# Patient Record
Sex: MALE | Race: OTHER | Employment: FULL TIME | URBAN - METROPOLITAN AREA
[De-identification: names, ages, dates, MRNs, and addresses within clinical notes are randomized per-mention and may not be internally consistent; named-entity substitution may affect disease eponyms.]

---

## 2018-04-12 ENCOUNTER — OFFICE VISIT (OUTPATIENT)
Dept: FAMILY MEDICINE CLINIC | Facility: CLINIC | Age: 58
End: 2018-04-12

## 2018-04-12 ENCOUNTER — NURSE TRIAGE (OUTPATIENT)
Dept: FAMILY MEDICINE CLINIC | Facility: CLINIC | Age: 58
End: 2018-04-12

## 2018-04-12 VITALS
SYSTOLIC BLOOD PRESSURE: 132 MMHG | HEART RATE: 84 BPM | WEIGHT: 195 LBS | DIASTOLIC BLOOD PRESSURE: 83 MMHG | HEIGHT: 70 IN | TEMPERATURE: 99 F | BODY MASS INDEX: 27.92 KG/M2

## 2018-04-12 DIAGNOSIS — J06.9 ACUTE URI: Primary | ICD-10-CM

## 2018-04-12 PROCEDURE — 99213 OFFICE O/P EST LOW 20 MIN: CPT | Performed by: NURSE PRACTITIONER

## 2018-04-12 RX ORDER — LEVOFLOXACIN 500 MG/1
500 TABLET, FILM COATED ORAL DAILY
Qty: 10 TABLET | Refills: 0 | Status: SHIPPED | OUTPATIENT
Start: 2018-04-12 | End: 2018-04-22

## 2018-04-12 RX ORDER — PREDNISONE 20 MG/1
TABLET ORAL
Qty: 10 TABLET | Refills: 0 | Status: SHIPPED | OUTPATIENT
Start: 2018-04-12 | End: 2018-08-21 | Stop reason: ALTCHOICE

## 2018-04-12 RX ORDER — GUAIFENESIN AND CODEINE PHOSPHATE 100; 10 MG/5ML; MG/5ML
SOLUTION ORAL
Qty: 118 ML | Refills: 0 | Status: SHIPPED | OUTPATIENT
Start: 2018-04-12 | End: 2018-08-21 | Stop reason: ALTCHOICE

## 2018-04-12 NOTE — PROGRESS NOTES
HPI  P there for chest congestion-having a lot of wheezing but no sputum production. Dry cough, feels like chest is rattling. Has sinus congestion-nasal d/c is clear. Pt denies fever, ear pain-has sore throat from coughing so much.   Taking otc meds with Outpatient Prescriptions:  levofloxacin (LEVAQUIN) 500 MG Oral Tab Take 1 tablet (500 mg total) by mouth daily. Disp: 10 tablet Rfl: 0   Guaifenesin-Codeine 100-10 MG/5ML Oral Syrup Take 1 tsp every 6 hours for cough as needed.  May cause drowsiness Disp: 1 days  cheratussin ac 1 tsp po q 6 hrs prn coughing  supportive care discussed    Please call if symptoms worsen or are not resolving. Discussed plan of care with pt and pt is in agreement. All questions answered.  Pt to call with questions or concerns

## 2018-04-12 NOTE — PATIENT INSTRUCTIONS
SINUSITIS (SINUS INFECTION)    -encourage fluids  -humidifier at bedside  -tylenol or ibuprofen for pain, fever  -check temperature with thermometer  -tom pot-use distilled water with accompanied salt packets 2-3 times per day and as needed  -call if sym

## 2018-08-20 ENCOUNTER — TELEPHONE (OUTPATIENT)
Dept: FAMILY MEDICINE CLINIC | Facility: CLINIC | Age: 58
End: 2018-08-20

## 2018-08-20 DIAGNOSIS — Z12.5 PROSTATE CANCER SCREENING: Primary | ICD-10-CM

## 2018-08-20 NOTE — TELEPHONE ENCOUNTER
STEPHY please schedule physical per Dr. Sheryl Bishop message below. Patient needs appointment for any orders.

## 2018-08-21 ENCOUNTER — OFFICE VISIT (OUTPATIENT)
Dept: FAMILY MEDICINE CLINIC | Facility: CLINIC | Age: 58
End: 2018-08-21

## 2018-08-21 VITALS
DIASTOLIC BLOOD PRESSURE: 77 MMHG | BODY MASS INDEX: 30 KG/M2 | HEART RATE: 77 BPM | SYSTOLIC BLOOD PRESSURE: 129 MMHG | WEIGHT: 209 LBS

## 2018-08-21 DIAGNOSIS — Z12.11 SCREEN FOR COLON CANCER: ICD-10-CM

## 2018-08-21 DIAGNOSIS — R35.0 URINARY FREQUENCY: ICD-10-CM

## 2018-08-21 DIAGNOSIS — I25.2 HISTORY OF HEART ATTACK: ICD-10-CM

## 2018-08-21 DIAGNOSIS — N40.1 BENIGN PROSTATIC HYPERPLASIA WITH LOWER URINARY TRACT SYMPTOMS, SYMPTOM DETAILS UNSPECIFIED: ICD-10-CM

## 2018-08-21 DIAGNOSIS — N52.9 ERECTILE DYSFUNCTION, UNSPECIFIED ERECTILE DYSFUNCTION TYPE: Primary | ICD-10-CM

## 2018-08-21 DIAGNOSIS — Z13.220 SCREENING, LIPID: ICD-10-CM

## 2018-08-21 DIAGNOSIS — Z72.0 TOBACCO ABUSE: ICD-10-CM

## 2018-08-21 PROCEDURE — 99212 OFFICE O/P EST SF 10 MIN: CPT | Performed by: FAMILY MEDICINE

## 2018-08-21 PROCEDURE — 99214 OFFICE O/P EST MOD 30 MIN: CPT | Performed by: FAMILY MEDICINE

## 2018-08-21 NOTE — PROGRESS NOTES
Sarah Hobbs is a 59year old male. Patient presents with:  Prostate Problem    HPI:   New to me. Reports slow flow with urination for few months and having issues with erectile dysfunction for few years. No chronic medications. Does smoke regularly. erectile dysfunction type  Checking labs to make sure nothing else going on. May do cialis daily depending on results. - CBC WITH DIFFERENTIAL WITH PLATELET; Future  - COMP METABOLIC PANEL (14); Future  - LIPID PANEL;  Future  - TSH W REFLEX TO FREE T4;

## 2018-08-22 ENCOUNTER — LAB ENCOUNTER (OUTPATIENT)
Dept: LAB | Age: 58
End: 2018-08-22
Attending: FAMILY MEDICINE
Payer: COMMERCIAL

## 2018-08-22 DIAGNOSIS — Z72.0 TOBACCO ABUSE: ICD-10-CM

## 2018-08-22 DIAGNOSIS — Z13.220 SCREENING, LIPID: ICD-10-CM

## 2018-08-22 DIAGNOSIS — N52.9 ERECTILE DYSFUNCTION, UNSPECIFIED ERECTILE DYSFUNCTION TYPE: ICD-10-CM

## 2018-08-22 DIAGNOSIS — I25.2 HISTORY OF HEART ATTACK: ICD-10-CM

## 2018-08-22 DIAGNOSIS — R35.0 URINARY FREQUENCY: ICD-10-CM

## 2018-08-22 LAB
ALBUMIN SERPL BCP-MCNC: 3.9 G/DL (ref 3.5–4.8)
ALBUMIN/GLOB SERPL: 1.1 {RATIO} (ref 1–2)
ALP SERPL-CCNC: 67 U/L (ref 32–100)
ALT SERPL-CCNC: 18 U/L (ref 17–63)
ANION GAP SERPL CALC-SCNC: 7 MMOL/L (ref 0–18)
AST SERPL-CCNC: 21 U/L (ref 15–41)
BACTERIA UR QL AUTO: NEGATIVE /HPF
BASOPHILS # BLD: 0.1 K/UL (ref 0–0.2)
BASOPHILS NFR BLD: 1 %
BILIRUB SERPL-MCNC: 0.6 MG/DL (ref 0.3–1.2)
BILIRUB UR QL: NEGATIVE
BUN SERPL-MCNC: 12 MG/DL (ref 8–20)
BUN/CREAT SERPL: 12.1 (ref 10–20)
CALCIUM SERPL-MCNC: 9.6 MG/DL (ref 8.5–10.5)
CHLORIDE SERPL-SCNC: 105 MMOL/L (ref 95–110)
CHOLEST SERPL-MCNC: 245 MG/DL (ref 110–200)
CLARITY UR: CLEAR
CO2 SERPL-SCNC: 28 MMOL/L (ref 22–32)
COLOR UR: YELLOW
CREAT SERPL-MCNC: 0.99 MG/DL (ref 0.5–1.5)
EOSINOPHIL # BLD: 0.3 K/UL (ref 0–0.7)
EOSINOPHIL NFR BLD: 3 %
ERYTHROCYTE [DISTWIDTH] IN BLOOD BY AUTOMATED COUNT: 13.5 % (ref 11–15)
GLOBULIN PLAS-MCNC: 3.5 G/DL (ref 2.5–3.7)
GLUCOSE SERPL-MCNC: 97 MG/DL (ref 70–99)
GLUCOSE UR-MCNC: NEGATIVE MG/DL
HCT VFR BLD AUTO: 47.6 % (ref 41–52)
HDLC SERPL-MCNC: 35 MG/DL
HGB BLD-MCNC: 16.5 G/DL (ref 13.5–17.5)
KETONES UR-MCNC: NEGATIVE MG/DL
LDLC SERPL CALC-MCNC: 151 MG/DL (ref 0–99)
LEUKOCYTE ESTERASE UR QL STRIP.AUTO: NEGATIVE
LYMPHOCYTES # BLD: 3 K/UL (ref 1–4)
LYMPHOCYTES NFR BLD: 29 %
MCH RBC QN AUTO: 33.5 PG (ref 27–32)
MCHC RBC AUTO-ENTMCNC: 34.7 G/DL (ref 32–37)
MCV RBC AUTO: 96.7 FL (ref 80–100)
MONOCYTES # BLD: 0.9 K/UL (ref 0–1)
MONOCYTES NFR BLD: 8 %
NEUTROPHILS # BLD AUTO: 6 K/UL (ref 1.8–7.7)
NEUTROPHILS NFR BLD: 59 %
NITRITE UR QL STRIP.AUTO: NEGATIVE
NONHDLC SERPL-MCNC: 210 MG/DL
OSMOLALITY UR CALC.SUM OF ELEC: 290 MOSM/KG (ref 275–295)
PATIENT FASTING: YES
PH UR: 5 [PH] (ref 5–8)
POTASSIUM SERPL-SCNC: 4.5 MMOL/L (ref 3.3–5.1)
PROT SERPL-MCNC: 7.4 G/DL (ref 5.9–8.4)
PROT UR-MCNC: NEGATIVE MG/DL
PSA SERPL-MCNC: 1.3 NG/ML (ref 0–4)
RBC # BLD AUTO: 4.92 M/UL (ref 4.5–5.9)
RBC #/AREA URNS AUTO: 2 /HPF
SODIUM SERPL-SCNC: 140 MMOL/L (ref 136–144)
SP GR UR STRIP: 1.03 (ref 1–1.03)
TRIGL SERPL-MCNC: 297 MG/DL (ref 1–149)
TSH SERPL-ACNC: 4.71 UIU/ML (ref 0.45–5.33)
UROBILINOGEN UR STRIP-ACNC: <2
VIT C UR-MCNC: NEGATIVE MG/DL
WBC # BLD AUTO: 10.3 K/UL (ref 4–11)
WBC #/AREA URNS AUTO: <1 /HPF

## 2018-08-22 PROCEDURE — 84403 ASSAY OF TOTAL TESTOSTERONE: CPT

## 2018-08-22 PROCEDURE — 80061 LIPID PANEL: CPT

## 2018-08-22 PROCEDURE — 93005 ELECTROCARDIOGRAM TRACING: CPT

## 2018-08-22 PROCEDURE — 84443 ASSAY THYROID STIM HORMONE: CPT

## 2018-08-22 PROCEDURE — 80053 COMPREHEN METABOLIC PANEL: CPT

## 2018-08-22 PROCEDURE — 85025 COMPLETE CBC W/AUTO DIFF WBC: CPT

## 2018-08-22 PROCEDURE — 36415 COLL VENOUS BLD VENIPUNCTURE: CPT

## 2018-08-22 PROCEDURE — 93010 ELECTROCARDIOGRAM REPORT: CPT | Performed by: FAMILY MEDICINE

## 2018-08-22 PROCEDURE — 81001 URINALYSIS AUTO W/SCOPE: CPT

## 2018-08-22 PROCEDURE — 84402 ASSAY OF FREE TESTOSTERONE: CPT

## 2018-08-25 LAB
TESTOSTERONE, FREE, S: 9.24 NG/DL
TESTOSTERONE, TOTAL, S: 420 NG/DL

## 2018-08-29 ENCOUNTER — PATIENT MESSAGE (OUTPATIENT)
Dept: FAMILY MEDICINE CLINIC | Facility: CLINIC | Age: 58
End: 2018-08-29

## 2018-08-29 NOTE — PROGRESS NOTES
You do have very elevated cholesterol. Need to change diet and start exercising 4-5 times a week 30-45 minutes. . Decrease food portions. No fast foods - stick with fresh fruits/veggies, chicken and fish.  Can also take fish oil 1000 mg 1-2 tablets twice a d

## 2018-09-04 DIAGNOSIS — R79.89 ABNORMAL THYROID STIMULATING HORMONE LEVEL: Primary | ICD-10-CM

## 2018-09-04 NOTE — TELEPHONE ENCOUNTER
Please see Calpian message. *-*-*This message has not been handled. *-*-*    Don't worry about the additional tests if your comfortable with the TSH results.  I have an appointment with Dr. Parker Vargas for the colonoscopy and I'll make an appointment with SUNITA

## 2018-09-11 ENCOUNTER — OFFICE VISIT (OUTPATIENT)
Dept: GASTROENTEROLOGY | Facility: CLINIC | Age: 58
End: 2018-09-11

## 2018-09-11 ENCOUNTER — TELEPHONE (OUTPATIENT)
Dept: GASTROENTEROLOGY | Facility: CLINIC | Age: 58
End: 2018-09-11

## 2018-09-11 VITALS
BODY MASS INDEX: 29.12 KG/M2 | HEART RATE: 77 BPM | HEIGHT: 71 IN | SYSTOLIC BLOOD PRESSURE: 130 MMHG | WEIGHT: 208 LBS | DIASTOLIC BLOOD PRESSURE: 80 MMHG

## 2018-09-11 DIAGNOSIS — Z12.11 ENCOUNTER FOR SCREENING COLONOSCOPY: Primary | ICD-10-CM

## 2018-09-11 DIAGNOSIS — Z80.0 FAMILY HISTORY OF COLON CANCER: ICD-10-CM

## 2018-09-11 DIAGNOSIS — Z12.11 COLON CANCER SCREENING: Primary | ICD-10-CM

## 2018-09-11 PROCEDURE — 99243 OFF/OP CNSLTJ NEW/EST LOW 30: CPT | Performed by: PHYSICIAN ASSISTANT

## 2018-09-11 PROCEDURE — 99212 OFFICE O/P EST SF 10 MIN: CPT | Performed by: PHYSICIAN ASSISTANT

## 2018-09-11 RX ORDER — POLYETHYLENE GLYCOL 3350, SODIUM CHLORIDE, SODIUM BICARBONATE, POTASSIUM CHLORIDE 420; 11.2; 5.72; 1.48 G/4L; G/4L; G/4L; G/4L
POWDER, FOR SOLUTION ORAL
Qty: 1 BOTTLE | Refills: 0 | Status: SHIPPED | OUTPATIENT
Start: 2018-09-11 | End: 2019-10-05 | Stop reason: ALTCHOICE

## 2018-09-11 NOTE — PATIENT INSTRUCTIONS
1. Schedule colonoscopy with MD provider (Princess Suero or Juanis Forrest) at [facility]. 2.  bowel prep from pharmacy - I have prescribed Trilyte split dose preparation    3. Continue all medications for procedure    4.  Read all bowel prep in

## 2018-09-11 NOTE — TELEPHONE ENCOUNTER
Scheduled for:  Colonoscopy 38087  Provider Name:   Date:  09/19/18  Location:  [facility]  Sedation:  MAC  Time:  10:30 ,Inform Pt that he will be notified on the day before prior to procedure  Prep:Split dose Trilyte  Meds/Allergies Reconciled?:  Physician

## 2018-09-18 ENCOUNTER — TELEPHONE (OUTPATIENT)
Dept: GASTROENTEROLOGY | Facility: CLINIC | Age: 58
End: 2018-09-18

## 2018-09-18 NOTE — TELEPHONE ENCOUNTER
Linn Gore called from [facility]. There was a cancellation in Dr Shelton Sharma schedule tomorrow, pt pt was moved from a 9:30am arrival to an 8:30am arrival. They have him on the phone, and he is irritated because of his .  He is still the last pt of the day, b

## 2018-09-19 ENCOUNTER — LAB REQUISITION (OUTPATIENT)
Dept: LAB | Facility: HOSPITAL | Age: 58
End: 2018-09-19
Payer: COMMERCIAL

## 2018-09-19 DIAGNOSIS — Z01.89 ENCOUNTER FOR OTHER SPECIFIED SPECIAL EXAMINATIONS: ICD-10-CM

## 2018-09-19 PROCEDURE — 88305 TISSUE EXAM BY PATHOLOGIST: CPT | Performed by: INTERNAL MEDICINE

## 2018-09-20 ENCOUNTER — TELEPHONE (OUTPATIENT)
Dept: GASTROENTEROLOGY | Facility: CLINIC | Age: 58
End: 2018-09-20

## 2018-09-20 NOTE — TELEPHONE ENCOUNTER
I mailed out colonoscopy results letter to pt  Updated health maintenance  Entered into 5 yr recall  Recall colon in 5 years per.  Colon done 9/19/18    GI staff: please place recall for colonoscopy in 5 years

## 2019-10-05 ENCOUNTER — OFFICE VISIT (OUTPATIENT)
Dept: FAMILY MEDICINE CLINIC | Facility: CLINIC | Age: 59
End: 2019-10-05

## 2019-10-05 VITALS
WEIGHT: 209.81 LBS | SYSTOLIC BLOOD PRESSURE: 133 MMHG | DIASTOLIC BLOOD PRESSURE: 72 MMHG | HEIGHT: 71 IN | BODY MASS INDEX: 29.37 KG/M2 | HEART RATE: 74 BPM

## 2019-10-05 DIAGNOSIS — I25.2 HISTORY OF HEART ATTACK: Primary | ICD-10-CM

## 2019-10-05 DIAGNOSIS — Z72.0 TOBACCO USE: ICD-10-CM

## 2019-10-05 DIAGNOSIS — E78.5 HYPERLIPIDEMIA, UNSPECIFIED HYPERLIPIDEMIA TYPE: ICD-10-CM

## 2019-10-05 DIAGNOSIS — Z00.00 ROUTINE MEDICAL EXAM: ICD-10-CM

## 2019-10-05 PROCEDURE — 99396 PREV VISIT EST AGE 40-64: CPT | Performed by: FAMILY MEDICINE

## 2019-10-05 RX ORDER — AZITHROMYCIN 250 MG/1
TABLET, FILM COATED ORAL
Qty: 6 TABLET | Refills: 0 | Status: SHIPPED | OUTPATIENT
Start: 2019-10-05 | End: 2019-10-05

## 2019-10-05 RX ORDER — ALBUTEROL SULFATE 90 UG/1
2 AEROSOL, METERED RESPIRATORY (INHALATION) EVERY 6 HOURS PRN
Qty: 1 INHALER | Refills: 0 | Status: SHIPPED | OUTPATIENT
Start: 2019-10-05 | End: 2019-10-05

## 2019-10-05 RX ORDER — ATORVASTATIN CALCIUM 20 MG/1
20 TABLET, FILM COATED ORAL NIGHTLY
Qty: 90 TABLET | Refills: 4 | Status: SHIPPED | OUTPATIENT
Start: 2019-10-05 | End: 2020-06-12

## 2019-10-05 NOTE — PROGRESS NOTES
Lela Ortiz is a 62year old male who presents for a complete physical exam.   HPI:   Reports gets dizzy symptoms. Reports when bending down and sometimes just off balance. Reports gets blurred vision at times. H/o heart attack but no cardiology.    Repo denies any skin lesions  EYES:denies blurred vision or double vision  HEENT: denies nasal congestion, sinus pain or ST  LUNGS: denies shortness of breath with exertion, denies orthopnea  CARDIOVASCULAR: denies chest pain on exertion  GI: denies abdominal p cholesterol and history of heart attack.       Vish James MD  10/5/2019  12:28 PM

## 2019-11-20 ENCOUNTER — TELEPHONE (OUTPATIENT)
Dept: FAMILY MEDICINE CLINIC | Facility: CLINIC | Age: 59
End: 2019-11-20

## 2019-11-20 NOTE — TELEPHONE ENCOUNTER
Pt called stating he is very upset on the process of the orders for prior auth, pt believes since the doctor ordered this CT since October this should have been initiated since then. Pt has appt for 11/23 for CT. Pt is requesting a call back. Please advise.

## 2019-11-20 NOTE — TELEPHONE ENCOUNTER
Patient as [employer], an authorization is not required for CT Scan done at [address] [place] [address]. Spoke with patient and advised.

## 2019-11-20 NOTE — TELEPHONE ENCOUNTER
Patient called he stated he was told he need Prior Authorization for the CT Scan.   He stated he was told he need approval for the insurance for the CT Scan

## 2019-11-23 ENCOUNTER — HOSPITAL ENCOUNTER (OUTPATIENT)
Dept: CT IMAGING | Facility: HOSPITAL | Age: 59
Discharge: HOME | End: 2019-11-23
Attending: FAMILY MEDICINE
Payer: COMMERCIAL

## 2019-11-23 ENCOUNTER — LAB ENCOUNTER (OUTPATIENT)
Dept: LAB | Facility: HOSPITAL | Age: 59
End: 2019-11-23
Attending: FAMILY MEDICINE
Payer: COMMERCIAL

## 2019-11-23 DIAGNOSIS — Z00.00 ROUTINE MEDICAL EXAM: ICD-10-CM

## 2019-11-23 DIAGNOSIS — Z72.0 TOBACCO USE: ICD-10-CM

## 2019-11-23 PROCEDURE — 80061 LIPID PANEL: CPT

## 2019-11-23 PROCEDURE — 84443 ASSAY THYROID STIM HORMONE: CPT

## 2019-11-23 PROCEDURE — 36415 COLL VENOUS BLD VENIPUNCTURE: CPT

## 2019-11-23 PROCEDURE — 80053 COMPREHEN METABOLIC PANEL: CPT

## 2019-11-23 PROCEDURE — 85025 COMPLETE CBC W/AUTO DIFF WBC: CPT

## 2019-11-23 PROCEDURE — 84439 ASSAY OF FREE THYROXINE: CPT

## 2019-11-24 NOTE — PROGRESS NOTES
Michelle Moreno - There was no masses on the CT Scan but you do have beginnings of emphysema - which is a chronic lung disease from the smoking. You need to stop smoking. Also there was atherosclerosis  in the arteries of your heart and aorta.  I have already started

## 2019-11-24 NOTE — PROGRESS NOTES
[facility], located within [facility] are stable.  Please continue on the atorvastatin. - Dr. Zaidi Credit

## 2019-12-02 ENCOUNTER — TELEPHONE (OUTPATIENT)
Dept: CARDIOLOGY | Age: 59
End: 2019-12-02

## 2019-12-16 ENCOUNTER — OFFICE VISIT (OUTPATIENT)
Dept: CARDIOLOGY CLINIC | Facility: CLINIC | Age: 59
End: 2019-12-16

## 2019-12-16 VITALS
BODY MASS INDEX: 29.63 KG/M2 | HEIGHT: 70 IN | HEART RATE: 72 BPM | WEIGHT: 207 LBS | SYSTOLIC BLOOD PRESSURE: 157 MMHG | DIASTOLIC BLOOD PRESSURE: 70 MMHG

## 2019-12-16 DIAGNOSIS — E78.00 PURE HYPERCHOLESTEROLEMIA: ICD-10-CM

## 2019-12-16 DIAGNOSIS — R42 DIZZINESS: ICD-10-CM

## 2019-12-16 DIAGNOSIS — I25.10 CORONARY ARTERY DISEASE INVOLVING NATIVE CORONARY ARTERY OF NATIVE HEART WITHOUT ANGINA PECTORIS: Primary | ICD-10-CM

## 2019-12-16 PROCEDURE — 99245 OFF/OP CONSLTJ NEW/EST HI 55: CPT | Performed by: INTERNAL MEDICINE

## 2019-12-16 NOTE — PROGRESS NOTES
Name:  Cesia Yanna  : 1960    Date of consultation:   2019    Referring physician: Lachelle Stout MD    Reason for Visit:  Patient presents with:  Consult: patient had a heart attack 7/10/02,referred for a stress test .patient states he h diagnosed.  Upcoming urologist appt on 18   • Colon adenoma 2018    repeat colonoscopy 2023   • Decorative tattoo    • Exposure to hepatitis 1971    Hep A   • Fetal and  jaundice When I had HEP   • Hemorrhoids 2018    1   • Lupus ( Value Date     (H) 11/23/2019    BUN 14 11/23/2019    BUNCREA 14.1 11/23/2019    CREATSERUM 0.99 11/23/2019    ANIONGAP 5 11/23/2019    GFRNAA 82 11/23/2019    GFRAA 95 11/23/2019    CA 9.8 11/23/2019    OSMOCALC 293 11/23/2019    ALKPHO 88 11/23/20

## 2019-12-26 ENCOUNTER — HOSPITAL ENCOUNTER (OUTPATIENT)
Dept: NUCLEAR MEDICINE | Facility: HOSPITAL | Age: 59
Discharge: HOME | End: 2019-12-26
Attending: INTERNAL MEDICINE
Payer: COMMERCIAL

## 2019-12-26 ENCOUNTER — TELEPHONE (OUTPATIENT)
Dept: CARDIOLOGY CLINIC | Facility: CLINIC | Age: 59
End: 2019-12-26

## 2019-12-26 ENCOUNTER — HOSPITAL ENCOUNTER (OUTPATIENT)
Dept: CV DIAGNOSTICS | Facility: HOSPITAL | Age: 59
Discharge: HOME | End: 2019-12-26
Attending: INTERNAL MEDICINE
Payer: COMMERCIAL

## 2019-12-26 ENCOUNTER — HOSPITAL ENCOUNTER (OUTPATIENT)
Dept: ULTRASOUND IMAGING | Facility: HOSPITAL | Age: 59
Discharge: HOME | End: 2019-12-26
Attending: INTERNAL MEDICINE
Payer: COMMERCIAL

## 2019-12-26 DIAGNOSIS — I65.29 STENOSIS OF CAROTID ARTERY, UNSPECIFIED LATERALITY: Primary | ICD-10-CM

## 2019-12-26 DIAGNOSIS — R42 DIZZINESS: ICD-10-CM

## 2019-12-26 DIAGNOSIS — I25.10 CORONARY ARTERY DISEASE INVOLVING NATIVE CORONARY ARTERY OF NATIVE HEART WITHOUT ANGINA PECTORIS: ICD-10-CM

## 2019-12-26 PROCEDURE — 78452 HT MUSCLE IMAGE SPECT MULT: CPT | Performed by: INTERNAL MEDICINE

## 2019-12-26 PROCEDURE — 93017 CV STRESS TEST TRACING ONLY: CPT | Performed by: INTERNAL MEDICINE

## 2019-12-26 PROCEDURE — 93880 EXTRACRANIAL BILAT STUDY: CPT | Performed by: INTERNAL MEDICINE

## 2019-12-26 PROCEDURE — 93018 CV STRESS TEST I&R ONLY: CPT | Performed by: INTERNAL MEDICINE

## 2019-12-26 PROCEDURE — 93016 CV STRESS TEST SUPVJ ONLY: CPT | Performed by: INTERNAL MEDICINE

## 2019-12-26 RX ORDER — ASPIRIN 81 MG/1
81 TABLET ORAL DAILY
Qty: 90 TABLET | Refills: 3 | Status: ON HOLD | OUTPATIENT
Start: 2019-12-26 | End: 2020-01-16

## 2019-12-26 NOTE — TELEPHONE ENCOUNTER
Received instruction from [facility] to have pt see Dr Jeanette Holcomb for left carotid artery soon. Pt to start aspirin 81 mg daily. Informed pt of these results and he will start the aspirin , information on Dr. Jeanette Holcomb given. No further quesitons.    Will probably ne

## 2020-01-01 ENCOUNTER — EXTERNAL RECORD (OUTPATIENT)
Dept: OTHER | Age: 60
End: 2020-01-01

## 2020-01-02 NOTE — H&P (VIEW-ONLY)
[facility]    Report of Consultation    Cristobal William 8/4/1960 MRN [id number]   Referring Provider Cherrie Keating MD  [address]  [place], [facility] PCP Josie Chauhan MD     Date of Consult:  1/2/2020  Re Problem Relation Age of Onset   • Heart Disorder Father    • Hypertension Father    • Cancer Father         Prostate Cancer -    • Other (Other) Father         Alzheimer's   • Heart Disease Mother    • Arthritis Sister         lupus and possible RA   • O organomegaly  Extremities: extremities normal, atraumatic, no cyanosis or edema  Neurologic: Alert and oriented X 3, normal strength and tone. Normal symmetric reflexes.  Normal coordination and gait    Results:     Laboratory Data:  Lab Results   Component internal carotid artery. 2. > 70% luminal stenosis left internal carotid artery. Assessment & Plan:   Patient has abnormal carotid Doppler with greater than 70% stenosis on the left side based on velocities.   He has had some symptoms which may b

## 2020-01-04 ENCOUNTER — HOSPITAL ENCOUNTER (OUTPATIENT)
Dept: CT IMAGING | Facility: HOSPITAL | Age: 60
Discharge: HOME | End: 2020-01-04
Attending: PHYSICIAN ASSISTANT
Payer: COMMERCIAL

## 2020-01-04 DIAGNOSIS — I65.23 BILATERAL CAROTID ARTERY STENOSIS: ICD-10-CM

## 2020-01-04 LAB — CREAT BLD-MCNC: 0.9 MG/DL (ref 0.7–1.3)

## 2020-01-04 PROCEDURE — 82565 ASSAY OF CREATININE: CPT

## 2020-01-04 PROCEDURE — 70498 CT ANGIOGRAPHY NECK: CPT | Performed by: PHYSICIAN ASSISTANT

## 2020-01-07 NOTE — PROGRESS NOTES
I have reviewed the CTA neck and there appears to be an unusual high lesion in the left internal carotid artery. It has appearance more of a limited dissection that is very focal and not associated with plaque or atherosclerosis.   We will have films revie

## 2020-01-13 ENCOUNTER — LAB ENCOUNTER (OUTPATIENT)
Dept: LAB | Facility: HOSPITAL | Age: 60
End: 2020-01-13
Attending: THORACIC SURGERY (CARDIOTHORACIC VASCULAR SURGERY)
Payer: COMMERCIAL

## 2020-01-13 DIAGNOSIS — Z01.818 PREOP TESTING: ICD-10-CM

## 2020-01-13 DIAGNOSIS — I65.29 CAROTID STENOSIS: ICD-10-CM

## 2020-01-13 LAB
ANION GAP SERPL CALC-SCNC: 8 MMOL/L (ref 0–18)
APTT PPP: 37 SECONDS (ref 23.2–35.3)
BACTERIA UR QL AUTO: NEGATIVE /HPF
BASOPHILS # BLD AUTO: 0.09 X10(3) UL (ref 0–0.2)
BASOPHILS NFR BLD AUTO: 1.1 %
BILIRUB UR QL: NEGATIVE
BUN BLD-MCNC: 13 MG/DL (ref 7–18)
BUN/CREAT SERPL: 12.3 (ref 10–20)
CALCIUM BLD-MCNC: 9.5 MG/DL (ref 8.5–10.1)
CHLORIDE SERPL-SCNC: 108 MMOL/L (ref 98–112)
CLARITY UR: CLEAR
CO2 SERPL-SCNC: 25 MMOL/L (ref 21–32)
COLOR UR: YELLOW
CREAT BLD-MCNC: 1.06 MG/DL (ref 0.7–1.3)
DEPRECATED RDW RBC AUTO: 43.6 FL (ref 35.1–46.3)
EOSINOPHIL # BLD AUTO: 0.28 X10(3) UL (ref 0–0.7)
EOSINOPHIL NFR BLD AUTO: 3.3 %
ERYTHROCYTE [DISTWIDTH] IN BLOOD BY AUTOMATED COUNT: 12.7 % (ref 11–15)
GLUCOSE BLD-MCNC: 162 MG/DL (ref 70–99)
GLUCOSE UR-MCNC: NEGATIVE MG/DL
HCT VFR BLD AUTO: 45.5 % (ref 39–53)
HGB BLD-MCNC: 15.8 G/DL (ref 13–17.5)
IMM GRANULOCYTES # BLD AUTO: 0.03 X10(3) UL (ref 0–1)
IMM GRANULOCYTES NFR BLD: 0.4 %
INR BLD: 1.05 (ref 0.9–1.2)
KETONES UR-MCNC: NEGATIVE MG/DL
LEUKOCYTE ESTERASE UR QL STRIP.AUTO: NEGATIVE
LYMPHOCYTES # BLD AUTO: 2.58 X10(3) UL (ref 1–4)
LYMPHOCYTES NFR BLD AUTO: 30.5 %
MCH RBC QN AUTO: 32.5 PG (ref 26–34)
MCHC RBC AUTO-ENTMCNC: 34.7 G/DL (ref 31–37)
MCV RBC AUTO: 93.6 FL (ref 80–100)
MONOCYTES # BLD AUTO: 0.71 X10(3) UL (ref 0.1–1)
MONOCYTES NFR BLD AUTO: 8.4 %
MRSA DNA SPEC QL NAA+PROBE: NEGATIVE
NEUTROPHILS # BLD AUTO: 4.77 X10 (3) UL (ref 1.5–7.7)
NEUTROPHILS # BLD AUTO: 4.77 X10(3) UL (ref 1.5–7.7)
NEUTROPHILS NFR BLD AUTO: 56.3 %
NITRITE UR QL STRIP.AUTO: NEGATIVE
OSMOLALITY SERPL CALC.SUM OF ELEC: 296 MOSM/KG (ref 275–295)
PH UR: 5 [PH] (ref 5–8)
PLATELET # BLD AUTO: 221 10(3)UL (ref 150–450)
POTASSIUM SERPL-SCNC: 3.7 MMOL/L (ref 3.5–5.1)
PROT UR-MCNC: NEGATIVE MG/DL
PROTHROMBIN TIME: 13.5 SECONDS (ref 11.8–14.5)
RBC # BLD AUTO: 4.86 X10(6)UL (ref 4.3–5.7)
RBC #/AREA URNS AUTO: 3 /HPF
SODIUM SERPL-SCNC: 141 MMOL/L (ref 136–145)
SP GR UR STRIP: 1.02 (ref 1–1.03)
UROBILINOGEN UR STRIP-ACNC: <2
WBC # BLD AUTO: 8.5 X10(3) UL (ref 4–11)
WBC #/AREA URNS AUTO: 1 /HPF

## 2020-01-13 PROCEDURE — 81001 URINALYSIS AUTO W/SCOPE: CPT

## 2020-01-13 PROCEDURE — 85730 THROMBOPLASTIN TIME PARTIAL: CPT

## 2020-01-13 PROCEDURE — 80048 BASIC METABOLIC PNL TOTAL CA: CPT

## 2020-01-13 PROCEDURE — 85610 PROTHROMBIN TIME: CPT

## 2020-01-13 PROCEDURE — 36415 COLL VENOUS BLD VENIPUNCTURE: CPT

## 2020-01-13 PROCEDURE — 87641 MR-STAPH DNA AMP PROBE: CPT

## 2020-01-13 PROCEDURE — 85025 COMPLETE CBC W/AUTO DIFF WBC: CPT

## 2020-01-13 RX ORDER — AZITHROMYCIN 250 MG/1
250 TABLET, FILM COATED ORAL DAILY
Status: ON HOLD | COMMUNITY
End: 2020-01-16

## 2020-01-13 RX ORDER — CLOPIDOGREL BISULFATE 75 MG/1
75 TABLET ORAL DAILY
Status: ON HOLD | COMMUNITY
End: 2020-01-16

## 2020-01-14 ENCOUNTER — CLINICAL ABSTRACT (OUTPATIENT)
Dept: CARDIOLOGY | Age: 60
End: 2020-01-14

## 2020-01-15 ENCOUNTER — HOSPITAL ENCOUNTER (INPATIENT)
Dept: INTERVENTIONAL RADIOLOGY/VASCULAR | Facility: HOSPITAL | Age: 60
LOS: 1 days | Discharge: HOME | DRG: 036 | End: 2020-01-16
Attending: THORACIC SURGERY (CARDIOTHORACIC VASCULAR SURGERY) | Admitting: THORACIC SURGERY (CARDIOTHORACIC VASCULAR SURGERY)
Payer: COMMERCIAL

## 2020-01-15 ENCOUNTER — ANESTHESIA (OUTPATIENT)
Dept: INTERVENTIONAL RADIOLOGY/VASCULAR | Facility: HOSPITAL | Age: 60
DRG: 036 | End: 2020-01-15
Payer: COMMERCIAL

## 2020-01-15 DIAGNOSIS — Z01.818 PREOP TESTING: Primary | ICD-10-CM

## 2020-01-15 DIAGNOSIS — I65.29 CAROTID STENOSIS: ICD-10-CM

## 2020-01-15 PROBLEM — I65.22 LEFT CAROTID STENOSIS: Status: ACTIVE | Noted: 2020-01-15

## 2020-01-15 LAB
APTT PPP: 44.1 SECONDS (ref 23.2–35.3)
DEPRECATED RDW RBC AUTO: 43.9 FL (ref 35.1–46.3)
ERYTHROCYTE [DISTWIDTH] IN BLOOD BY AUTOMATED COUNT: 12.8 % (ref 11–15)
HCT VFR BLD AUTO: 43.6 % (ref 39–53)
HGB BLD-MCNC: 14.7 G/DL (ref 13–17.5)
INR BLD: 1.11 (ref 0.9–1.2)
MCH RBC QN AUTO: 31.7 PG (ref 26–34)
MCHC RBC AUTO-ENTMCNC: 33.7 G/DL (ref 31–37)
MCV RBC AUTO: 94 FL (ref 80–100)
PLATELET # BLD AUTO: 211 10(3)UL (ref 150–450)
PROTHROMBIN TIME: 14.1 SECONDS (ref 11.8–14.5)
RBC # BLD AUTO: 4.64 X10(6)UL (ref 4.3–5.7)
WBC # BLD AUTO: 10.4 X10(3) UL (ref 4–11)

## 2020-01-15 PROCEDURE — 037L3DZ DILATION OF LEFT INTERNAL CAROTID ARTERY WITH INTRALUMINAL DEVICE, PERCUTANEOUS APPROACH: ICD-10-PCS | Performed by: THORACIC SURGERY (CARDIOTHORACIC VASCULAR SURGERY)

## 2020-01-15 PROCEDURE — 99232 SBSQ HOSP IP/OBS MODERATE 35: CPT | Performed by: HOSPITALIST

## 2020-01-15 PROCEDURE — B3171ZZ FLUOROSCOPY OF LEFT INTERNAL CAROTID ARTERY USING LOW OSMOLAR CONTRAST: ICD-10-PCS | Performed by: INTERNAL MEDICINE

## 2020-01-15 PROCEDURE — 37216 TRANSCATH STENT CCA W/O EPS: CPT | Performed by: INTERNAL MEDICINE

## 2020-01-15 RX ORDER — POLYETHYLENE GLYCOL 3350 17 G/17G
17 POWDER, FOR SOLUTION ORAL DAILY PRN
Status: DISCONTINUED | OUTPATIENT
Start: 2020-01-15 | End: 2020-01-16

## 2020-01-15 RX ORDER — ONDANSETRON 2 MG/ML
4 INJECTION INTRAMUSCULAR; INTRAVENOUS ONCE AS NEEDED
Status: DISCONTINUED | OUTPATIENT
Start: 2020-01-15 | End: 2020-01-15

## 2020-01-15 RX ORDER — HYDROCODONE BITARTRATE AND ACETAMINOPHEN 5; 325 MG/1; MG/1
1 TABLET ORAL EVERY 4 HOURS PRN
Status: DISCONTINUED | OUTPATIENT
Start: 2020-01-15 | End: 2020-01-16

## 2020-01-15 RX ORDER — NEOSTIGMINE METHYLSULFATE 0.5 MG/ML
INJECTION INTRAVENOUS AS NEEDED
Status: DISCONTINUED | OUTPATIENT
Start: 2020-01-15 | End: 2020-01-15 | Stop reason: SURG

## 2020-01-15 RX ORDER — LIDOCAINE HYDROCHLORIDE 20 MG/ML
INJECTION, SOLUTION EPIDURAL; INFILTRATION; INTRACAUDAL; PERINEURAL
Status: DISCONTINUED
Start: 2020-01-15 | End: 2020-01-15 | Stop reason: WASHOUT

## 2020-01-15 RX ORDER — HYDROCODONE BITARTRATE AND ACETAMINOPHEN 5; 325 MG/1; MG/1
2 TABLET ORAL EVERY 4 HOURS PRN
Status: DISCONTINUED | OUTPATIENT
Start: 2020-01-15 | End: 2020-01-16

## 2020-01-15 RX ORDER — ONDANSETRON 2 MG/ML
INJECTION INTRAMUSCULAR; INTRAVENOUS AS NEEDED
Status: DISCONTINUED | OUTPATIENT
Start: 2020-01-15 | End: 2020-01-15 | Stop reason: SURG

## 2020-01-15 RX ORDER — METOPROLOL TARTRATE 5 MG/5ML
5 INJECTION INTRAVENOUS EVERY 6 HOURS PRN
Status: DISCONTINUED | OUTPATIENT
Start: 2020-01-15 | End: 2020-01-16

## 2020-01-15 RX ORDER — SODIUM CHLORIDE, SODIUM LACTATE, POTASSIUM CHLORIDE, CALCIUM CHLORIDE 600; 310; 30; 20 MG/100ML; MG/100ML; MG/100ML; MG/100ML
INJECTION, SOLUTION INTRAVENOUS CONTINUOUS
Status: DISCONTINUED | OUTPATIENT
Start: 2020-01-15 | End: 2020-01-16

## 2020-01-15 RX ORDER — HYDROMORPHONE HYDROCHLORIDE 1 MG/ML
0.6 INJECTION, SOLUTION INTRAMUSCULAR; INTRAVENOUS; SUBCUTANEOUS EVERY 5 MIN PRN
Status: DISCONTINUED | OUTPATIENT
Start: 2020-01-15 | End: 2020-01-15

## 2020-01-15 RX ORDER — ROCURONIUM BROMIDE 10 MG/ML
INJECTION, SOLUTION INTRAVENOUS AS NEEDED
Status: DISCONTINUED | OUTPATIENT
Start: 2020-01-15 | End: 2020-01-15 | Stop reason: SURG

## 2020-01-15 RX ORDER — ATORVASTATIN CALCIUM 20 MG/1
20 TABLET, FILM COATED ORAL NIGHTLY
Status: DISCONTINUED | OUTPATIENT
Start: 2020-01-15 | End: 2020-01-16

## 2020-01-15 RX ORDER — NALOXONE HYDROCHLORIDE 0.4 MG/ML
80 INJECTION, SOLUTION INTRAMUSCULAR; INTRAVENOUS; SUBCUTANEOUS AS NEEDED
Status: DISCONTINUED | OUTPATIENT
Start: 2020-01-15 | End: 2020-01-15

## 2020-01-15 RX ORDER — SODIUM PHOSPHATE, DIBASIC AND SODIUM PHOSPHATE, MONOBASIC 7; 19 G/133ML; G/133ML
1 ENEMA RECTAL ONCE AS NEEDED
Status: DISCONTINUED | OUTPATIENT
Start: 2020-01-15 | End: 2020-01-16

## 2020-01-15 RX ORDER — PROCHLORPERAZINE EDISYLATE 5 MG/ML
5 INJECTION INTRAMUSCULAR; INTRAVENOUS ONCE AS NEEDED
Status: DISCONTINUED | OUTPATIENT
Start: 2020-01-15 | End: 2020-01-15

## 2020-01-15 RX ORDER — MORPHINE SULFATE 4 MG/ML
2 INJECTION, SOLUTION INTRAMUSCULAR; INTRAVENOUS EVERY 10 MIN PRN
Status: DISCONTINUED | OUTPATIENT
Start: 2020-01-15 | End: 2020-01-15

## 2020-01-15 RX ORDER — DEXTROSE AND SODIUM CHLORIDE 5; .45 G/100ML; G/100ML
INJECTION, SOLUTION INTRAVENOUS CONTINUOUS
Status: DISCONTINUED | OUTPATIENT
Start: 2020-01-15 | End: 2020-01-16

## 2020-01-15 RX ORDER — HEPARIN SODIUM 1000 [USP'U]/ML
INJECTION, SOLUTION INTRAVENOUS; SUBCUTANEOUS AS NEEDED
Status: DISCONTINUED | OUTPATIENT
Start: 2020-01-15 | End: 2020-01-15 | Stop reason: SURG

## 2020-01-15 RX ORDER — HYDROMORPHONE HYDROCHLORIDE 1 MG/ML
0.2 INJECTION, SOLUTION INTRAMUSCULAR; INTRAVENOUS; SUBCUTANEOUS EVERY 5 MIN PRN
Status: DISCONTINUED | OUTPATIENT
Start: 2020-01-15 | End: 2020-01-15

## 2020-01-15 RX ORDER — MORPHINE SULFATE 4 MG/ML
2 INJECTION, SOLUTION INTRAMUSCULAR; INTRAVENOUS EVERY 2 HOUR PRN
Status: DISCONTINUED | OUTPATIENT
Start: 2020-01-15 | End: 2020-01-16

## 2020-01-15 RX ORDER — ASPIRIN 81 MG/1
81 TABLET ORAL DAILY
Status: DISCONTINUED | OUTPATIENT
Start: 2020-01-16 | End: 2020-01-16

## 2020-01-15 RX ORDER — MORPHINE SULFATE 4 MG/ML
4 INJECTION, SOLUTION INTRAMUSCULAR; INTRAVENOUS EVERY 10 MIN PRN
Status: DISCONTINUED | OUTPATIENT
Start: 2020-01-15 | End: 2020-01-15

## 2020-01-15 RX ORDER — BISACODYL 10 MG
10 SUPPOSITORY, RECTAL RECTAL
Status: DISCONTINUED | OUTPATIENT
Start: 2020-01-15 | End: 2020-01-16

## 2020-01-15 RX ORDER — PHENYLEPHRINE HCL 10 MG/ML
VIAL (ML) INJECTION AS NEEDED
Status: DISCONTINUED | OUTPATIENT
Start: 2020-01-15 | End: 2020-01-15 | Stop reason: SURG

## 2020-01-15 RX ORDER — PROTAMINE SULFATE 10 MG/ML
INJECTION, SOLUTION INTRAVENOUS AS NEEDED
Status: DISCONTINUED | OUTPATIENT
Start: 2020-01-15 | End: 2020-01-15 | Stop reason: SURG

## 2020-01-15 RX ORDER — MORPHINE SULFATE 4 MG/ML
1 INJECTION, SOLUTION INTRAMUSCULAR; INTRAVENOUS EVERY 2 HOUR PRN
Status: DISCONTINUED | OUTPATIENT
Start: 2020-01-15 | End: 2020-01-16

## 2020-01-15 RX ORDER — METOPROLOL TARTRATE 5 MG/5ML
2.5 INJECTION INTRAVENOUS
Status: DISCONTINUED | OUTPATIENT
Start: 2020-01-15 | End: 2020-01-15

## 2020-01-15 RX ORDER — HYDROMORPHONE HYDROCHLORIDE 1 MG/ML
0.4 INJECTION, SOLUTION INTRAMUSCULAR; INTRAVENOUS; SUBCUTANEOUS EVERY 5 MIN PRN
Status: DISCONTINUED | OUTPATIENT
Start: 2020-01-15 | End: 2020-01-15

## 2020-01-15 RX ORDER — HYDROCODONE BITARTRATE AND ACETAMINOPHEN 5; 325 MG/1; MG/1
1 TABLET ORAL AS NEEDED
Status: DISCONTINUED | OUTPATIENT
Start: 2020-01-15 | End: 2020-01-15

## 2020-01-15 RX ORDER — MORPHINE SULFATE 10 MG/ML
6 INJECTION, SOLUTION INTRAMUSCULAR; INTRAVENOUS EVERY 10 MIN PRN
Status: DISCONTINUED | OUTPATIENT
Start: 2020-01-15 | End: 2020-01-15

## 2020-01-15 RX ORDER — CLINDAMYCIN PHOSPHATE 900 MG/50ML
INJECTION INTRAVENOUS
Status: DISCONTINUED
Start: 2020-01-15 | End: 2020-01-15 | Stop reason: WASHOUT

## 2020-01-15 RX ORDER — FAMOTIDINE 20 MG/1
20 TABLET ORAL ONCE
Status: DISCONTINUED | OUTPATIENT
Start: 2020-01-15 | End: 2020-01-16

## 2020-01-15 RX ORDER — METOCLOPRAMIDE 10 MG/1
10 TABLET ORAL ONCE
Status: DISCONTINUED | OUTPATIENT
Start: 2020-01-15 | End: 2020-01-16

## 2020-01-15 RX ORDER — ENOXAPARIN SODIUM 100 MG/ML
40 INJECTION SUBCUTANEOUS DAILY
Status: DISCONTINUED | OUTPATIENT
Start: 2020-01-16 | End: 2020-01-16

## 2020-01-15 RX ORDER — HYDROCODONE BITARTRATE AND ACETAMINOPHEN 5; 325 MG/1; MG/1
2 TABLET ORAL AS NEEDED
Status: DISCONTINUED | OUTPATIENT
Start: 2020-01-15 | End: 2020-01-15

## 2020-01-15 RX ORDER — ACETAMINOPHEN 500 MG
1000 TABLET ORAL ONCE
Status: DISCONTINUED | OUTPATIENT
Start: 2020-01-15 | End: 2020-01-16

## 2020-01-15 RX ORDER — VANCOMYCIN HYDROCHLORIDE
15 ONCE
Status: COMPLETED | OUTPATIENT
Start: 2020-01-15 | End: 2020-01-15

## 2020-01-15 RX ORDER — HALOPERIDOL 5 MG/ML
0.25 INJECTION INTRAMUSCULAR ONCE AS NEEDED
Status: DISCONTINUED | OUTPATIENT
Start: 2020-01-15 | End: 2020-01-15

## 2020-01-15 RX ORDER — DEXAMETHASONE SODIUM PHOSPHATE 4 MG/ML
VIAL (ML) INJECTION AS NEEDED
Status: DISCONTINUED | OUTPATIENT
Start: 2020-01-15 | End: 2020-01-15 | Stop reason: SURG

## 2020-01-15 RX ORDER — DOCUSATE SODIUM 100 MG/1
100 CAPSULE, LIQUID FILLED ORAL 2 TIMES DAILY
Status: DISCONTINUED | OUTPATIENT
Start: 2020-01-15 | End: 2020-01-16

## 2020-01-15 RX ORDER — ONDANSETRON 2 MG/ML
4 INJECTION INTRAMUSCULAR; INTRAVENOUS EVERY 6 HOURS PRN
Status: DISCONTINUED | OUTPATIENT
Start: 2020-01-15 | End: 2020-01-16

## 2020-01-15 RX ORDER — CLOPIDOGREL BISULFATE 75 MG/1
75 TABLET ORAL DAILY
Status: DISCONTINUED | OUTPATIENT
Start: 2020-01-16 | End: 2020-01-16

## 2020-01-15 RX ORDER — ACETAMINOPHEN 325 MG/1
650 TABLET ORAL EVERY 4 HOURS PRN
Status: DISCONTINUED | OUTPATIENT
Start: 2020-01-15 | End: 2020-01-16

## 2020-01-15 RX ORDER — NITROGLYCERIN 20 MG/100ML
INJECTION INTRAVENOUS CONTINUOUS PRN
Status: DISCONTINUED | OUTPATIENT
Start: 2020-01-15 | End: 2020-01-16

## 2020-01-15 RX ORDER — GLYCOPYRROLATE 0.2 MG/ML
INJECTION INTRAMUSCULAR; INTRAVENOUS AS NEEDED
Status: DISCONTINUED | OUTPATIENT
Start: 2020-01-15 | End: 2020-01-15 | Stop reason: SURG

## 2020-01-15 RX ADMIN — SODIUM CHLORIDE, SODIUM LACTATE, POTASSIUM CHLORIDE, CALCIUM CHLORIDE: 600; 310; 30; 20 INJECTION, SOLUTION INTRAVENOUS at 09:29:00

## 2020-01-15 RX ADMIN — NITROGLYCERIN 10 MCG/MIN: 20 INJECTION INTRAVENOUS at 17:44:00

## 2020-01-15 RX ADMIN — GLYCOPYRROLATE 0.2 MG: 0.2 INJECTION INTRAMUSCULAR; INTRAVENOUS at 08:27:00

## 2020-01-15 RX ADMIN — NITROGLYCERIN 15 MCG/MIN: 20 INJECTION INTRAVENOUS at 14:20:00

## 2020-01-15 RX ADMIN — NEOSTIGMINE METHYLSULFATE 4 MG: 0.5 INJECTION INTRAVENOUS at 09:08:00

## 2020-01-15 RX ADMIN — NITROGLYCERIN 5 MCG/MIN: 20 INJECTION INTRAVENOUS at 18:00:00

## 2020-01-15 RX ADMIN — SODIUM CHLORIDE, SODIUM LACTATE, POTASSIUM CHLORIDE, CALCIUM CHLORIDE: 600; 310; 30; 20 INJECTION, SOLUTION INTRAVENOUS at 07:37:00

## 2020-01-15 RX ADMIN — VANCOMYCIN HYDROCHLORIDE 1500 MG: at 21:12:00

## 2020-01-15 RX ADMIN — DEXAMETHASONE SODIUM PHOSPHATE 4 MG: 4 MG/ML VIAL (ML) INJECTION at 07:45:00

## 2020-01-15 RX ADMIN — METOPROLOL TARTRATE 5 MG: 5 INJECTION INTRAVENOUS at 15:15:00

## 2020-01-15 RX ADMIN — ROCURONIUM BROMIDE 10 MG: 10 INJECTION, SOLUTION INTRAVENOUS at 08:25:00

## 2020-01-15 RX ADMIN — METOPROLOL TARTRATE 5 MG: 5 INJECTION INTRAVENOUS at 19:57:00

## 2020-01-15 RX ADMIN — NITROGLYCERIN 60 MCG/MIN: 20 INJECTION INTRAVENOUS at 14:46:00

## 2020-01-15 RX ADMIN — ONDANSETRON 4 MG: 2 INJECTION INTRAMUSCULAR; INTRAVENOUS at 08:56:00

## 2020-01-15 RX ADMIN — HEPARIN SODIUM 9000 UNITS: 1000 INJECTION, SOLUTION INTRAVENOUS; SUBCUTANEOUS at 08:20:00

## 2020-01-15 RX ADMIN — NITROGLYCERIN 5 MCG/MIN: 20 INJECTION INTRAVENOUS at 14:10:00

## 2020-01-15 RX ADMIN — NITROGLYCERIN 30 MCG/MIN: 20 INJECTION INTRAVENOUS at 14:31:00

## 2020-01-15 RX ADMIN — ROCURONIUM BROMIDE 50 MG: 10 INJECTION, SOLUTION INTRAVENOUS at 07:41:00

## 2020-01-15 RX ADMIN — GLYCOPYRROLATE 0.6 MG: 0.2 INJECTION INTRAMUSCULAR; INTRAVENOUS at 09:08:00

## 2020-01-15 RX ADMIN — PHENYLEPHRINE HCL 100 MCG: 10 MG/ML VIAL (ML) INJECTION at 08:09:00

## 2020-01-15 RX ADMIN — PROTAMINE SULFATE 50 MG: 10 INJECTION, SOLUTION INTRAVENOUS at 09:03:00

## 2020-01-15 RX ADMIN — NITROGLYCERIN 20 MCG/MIN: 20 INJECTION INTRAVENOUS at 16:30:00

## 2020-01-15 RX ADMIN — NITROGLYCERIN 30 MCG/MIN: 20 INJECTION INTRAVENOUS at 16:00:00

## 2020-01-15 RX ADMIN — NITROGLYCERIN 40 MCG/MIN: 20 INJECTION INTRAVENOUS at 15:20:00

## 2020-01-15 RX ADMIN — PHENYLEPHRINE HCL 100 MCG: 10 MG/ML VIAL (ML) INJECTION at 08:01:00

## 2020-01-15 NOTE — INTERVAL H&P NOTE
Pre-op Diagnosis: carotid stenosis    The above referenced H&P was reviewed by Juli Bello MD on 1/15/2020, the patient was examined and no significant changes have occurred in the patient's condition since the H&P was performed.   I discussed with t

## 2020-01-15 NOTE — OPERATIVE REPORT
[facility]  Operative Note     Wiyot Organ Location: OR   CSN [id number] MRN [id number]   Admission Date 1/15/2020 Operation Date 1/15/2020   Attending Physician Sharon Pat MD Operating Physician Sage Whittaker MD are not limited to bleeding, infection, stroke, myocardial infarction and death.   He also understood there is a chance for local nerve complications that could cause difficulty with swallowing, hoarseness, or loss of control the tongue to enunciate or chew was advanced size 5 x 30 and expanded across the lesion and bifurcation. Next the en route stent was advanced across the lesion it was size 8 x 40 and self-expanding.   After 2 minutes of flow reversal the completion angiogram was performed from 2 views an

## 2020-01-15 NOTE — ANESTHESIA PROCEDURE NOTES
Airway  Urgency: Elective      General Information and Staff    Patient location during procedure: OR  Anesthesiologist: Preeti Ames MD  Performed: anesthesiologist     Indications and Patient Condition  Indications for airway management: anesthesia  Se

## 2020-01-15 NOTE — PROGRESS NOTES
[facility]    Progress Note    Cesia Led Patient Status:  Outpatient in a Bed    1960 MRN [id number]   Location [facility] Attending Riri Mann MD   Hosp Day # 0 PCP Lachelle Stout MD Results:     Lab Results   Component Value Date    WBC 10.4 01/15/2020    HGB 14.7 01/15/2020    HCT 43.6 01/15/2020    .0 01/15/2020    CREATSERUM 1.06 01/13/2020    BUN 13 01/13/2020     01/13/2020    K 3.7 01/13/2020     0 fall precautions

## 2020-01-15 NOTE — ANESTHESIA POSTPROCEDURE EVALUATION
Patient: Lasha Barfield    Procedure Summary     Date:  01/15/20 Room / Location:  [facility]    Anesthesia Start:  2562 Anesthesia Stop:  0930    Procedure:  IR CAROTID STENT Diagnosis:       Carotid stenosis      Carotid sten

## 2020-01-15 NOTE — PROCEDURES
Katia Prasad 25  note  Cesia Led Patient Status:  Outpatient in a Bed    1960 MRN [id number]   Location [facility] Attending Riri Mann MD   Hosp Day # 0 PCP Lachelle Stout MD       Cardio

## 2020-01-15 NOTE — ANESTHESIA PREPROCEDURE EVALUATION
Anesthesia PreOp Note    HPI:     Belle Silvestre is a 62year old male who presents for preoperative consultation requested by: * No surgeons listed *    Date of Surgery: 1/15/2020    * No procedures listed *  Indication: * No pre-op diagnosis entered * days, Disp: , Rfl: , 1/14/2020 at Unknown time  aspirin (ASPIRIN 81) 81 MG Oral Tab EC, Take 1 tablet (81 mg total) by mouth daily. , Disp: 90 tablet, Rfl: 3, 1/15/2020 at Unknown time  atorvastatin 20 MG Oral Tab, Take 1 tablet (20 mg total) by mouth night Alcohol/week: 2.0 standard drinks        Frequency: 2-4 times a month        Drinks per session: 1 or 2        Binge frequency: Monthly        Comment: socially      Drug use: No      Sexual activity: Not on file    Lifestyle      Physical activity: 01/15/20  0640   BP:  132/77   Pulse:  69   Resp:  19   Temp:  97.9 °F (36.6 °C)   TempSrc:  Oral   SpO2:  95%   Weight: 93.9 kg (207 lb)         Anesthesia Evaluation     Patient summary reviewed and Nursing notes reviewed    History of anesthetic complic

## 2020-01-15 NOTE — ANESTHESIA PROCEDURE NOTES
Arterial Line  Performed by: Jeannie Boast, MD  Authorized by: Jeannie Boast, MD     General Information and Staff    Procedure Start:   Anesthesiologist: Jeannie Boast, MD  Performed By:  Anesthesiologist  Patient Location:  OR  Indication: continuous b

## 2020-01-16 VITALS
HEART RATE: 77 BPM | TEMPERATURE: 98 F | BODY MASS INDEX: 30 KG/M2 | OXYGEN SATURATION: 95 % | DIASTOLIC BLOOD PRESSURE: 77 MMHG | SYSTOLIC BLOOD PRESSURE: 142 MMHG | WEIGHT: 210 LBS | RESPIRATION RATE: 18 BRPM

## 2020-01-16 LAB
ANION GAP SERPL CALC-SCNC: 8 MMOL/L (ref 0–18)
BUN BLD-MCNC: 12 MG/DL (ref 7–18)
BUN/CREAT SERPL: 10.9 (ref 10–20)
CALCIUM BLD-MCNC: 9.2 MG/DL (ref 8.5–10.1)
CHLORIDE SERPL-SCNC: 109 MMOL/L (ref 98–112)
CO2 SERPL-SCNC: 25 MMOL/L (ref 21–32)
CREAT BLD-MCNC: 1.1 MG/DL (ref 0.7–1.3)
DEPRECATED RDW RBC AUTO: 44.4 FL (ref 35.1–46.3)
ERYTHROCYTE [DISTWIDTH] IN BLOOD BY AUTOMATED COUNT: 13 % (ref 11–15)
GLUCOSE BLD-MCNC: 129 MG/DL (ref 70–99)
HCT VFR BLD AUTO: 43.3 % (ref 39–53)
HGB BLD-MCNC: 14.7 G/DL (ref 13–17.5)
MCH RBC QN AUTO: 31.8 PG (ref 26–34)
MCHC RBC AUTO-ENTMCNC: 33.9 G/DL (ref 31–37)
MCV RBC AUTO: 93.7 FL (ref 80–100)
OSMOLALITY SERPL CALC.SUM OF ELEC: 295 MOSM/KG (ref 275–295)
PLATELET # BLD AUTO: 229 10(3)UL (ref 150–450)
POTASSIUM SERPL-SCNC: 4 MMOL/L (ref 3.5–5.1)
RBC # BLD AUTO: 4.62 X10(6)UL (ref 4.3–5.7)
SODIUM SERPL-SCNC: 142 MMOL/L (ref 136–145)
WBC # BLD AUTO: 16 X10(3) UL (ref 4–11)

## 2020-01-16 PROCEDURE — 99239 HOSP IP/OBS DSCHRG MGMT >30: CPT | Performed by: HOSPITALIST

## 2020-01-16 PROCEDURE — 3E023GC INTRODUCTION OF OTHER THERAPEUTIC SUBSTANCE INTO MUSCLE, PERCUTANEOUS APPROACH: ICD-10-PCS | Performed by: HOSPITALIST

## 2020-01-16 RX ORDER — CLOPIDOGREL BISULFATE 75 MG/1
75 TABLET ORAL DAILY
Qty: 90 TABLET | Refills: 0 | Status: SHIPPED | OUTPATIENT
Start: 2020-01-17 | End: 2020-12-19

## 2020-01-16 RX ORDER — ASPIRIN 81 MG/1
81 TABLET ORAL DAILY
Qty: 90 TABLET | Refills: 0 | Status: SHIPPED | OUTPATIENT
Start: 2020-01-17 | End: 2020-06-12

## 2020-01-16 RX ADMIN — DOCUSATE SODIUM 100 MG: 100 CAPSULE, LIQUID FILLED ORAL at 08:52:00

## 2020-01-16 RX ADMIN — ENOXAPARIN SODIUM 40 MG: 100 INJECTION SUBCUTANEOUS at 08:52:00

## 2020-01-16 RX ADMIN — ACETAMINOPHEN 650 MG: 325 TABLET ORAL at 09:26:00

## 2020-01-16 RX ADMIN — CLOPIDOGREL BISULFATE 75 MG: 75 TABLET ORAL at 08:52:00

## 2020-01-16 RX ADMIN — METOPROLOL TARTRATE 5 MG: 5 INJECTION INTRAVENOUS at 02:37:00

## 2020-01-16 RX ADMIN — ASPIRIN 81 MG: 81 TABLET ORAL at 08:52:00

## 2020-01-16 NOTE — DISCHARGE SUMMARY
[facility] Discharge Summary   Patient ID:  Chandrika Gaming  [id number]  8 year old  8/4/1960    Admit date: 1/15/2020  Discharge date: 1/16/2020  Primary Care Physician: Kirk Verduzco MD   Attending Physician: Blayne Perez MD   Consults: 81 MG Tbec  Take 1 tablet (81 mg total) by mouth daily. Start taking on:  January 17, 2020     atorvastatin 20 MG Tabs  Commonly known as:  LIPITOR  Take 1 tablet (20 mg total) by mouth nightly.      Clopidogrel Bisulfate 75 MG Tabs  Commonly known as:  PL MD  Hospitalist  1/16/2020

## 2020-01-16 NOTE — PLAN OF CARE
Problem: Patient Centered Care  Goal: Patient preferences are identified and integrated in the patient's plan of care  Description  Interventions:  - What would you like us to know as we care for you?   - Provide timely, complete, and accurate informatio guidelines  Outcome: Progressing     Problem: SAFETY ADULT - FALL  Goal: Free from fall injury  Description  INTERVENTIONS:  - Assess pt frequently for physical needs  - Identify cognitive and physical deficits and behaviors that affect risk of falls.   - I integrity  - Assess and document dressing/incision, wound bed, drain sites and surrounding tissue  - Implement wound care per orders  - Initiate isolation precautions as appropriate  - Initiate Pressure Ulcer prevention bundle as indicated  Outcome: Janine

## 2020-01-16 NOTE — PLAN OF CARE
Problem: PAIN - ADULT  Goal: Verbalizes/displays adequate comfort level or patient's stated pain goal  Description  INTERVENTIONS:  - Encourage pt to monitor pain and request assistance  - Assess pain using appropriate pain scale  - Administer analgesics hemodynamic stability  - Monitor arterial and/or venous puncture sites for bleeding and/or hematoma  - Assess quality of pulses, skin color and temperature  - Assess for signs of decreased coronary artery perfusion - ex.  Angina  - Evaluate fluid balance, a symptoms of internal bleeding  - Monitor lab trends  - Patient is to report abnormal signs of bleeding to staff  - Avoid use of toothpicks and dental floss  - Use electric shaver for shaving  - Use soft bristle tooth brush  - Limit straining and forceful n

## 2020-01-16 NOTE — PAYOR COMM NOTE
--------------  ADMISSION REVIEW     Payor: [address] PPO  Subscriber #:  [id number]  Authorization Number: [id number]    Admit date: 1/15/20  Admit time: 36       Admitting Physician: Lana Ramirez MD  Attending Physician:  Yehuda Torres Intravenous Guru Salmon RN    1/15/2020 1630 Rate/Dose Change 20 mcg/min Intravenous Guru Salmon RN    1/15/2020 1600 Rate/Dose Change 30 mcg/min Intravenous Guru Salmon RN    1/15/2020 1520 Rate/Dose Change 40 mcg/min Intravenous Kati Guerrero appearance of a dissection with flap obstructing the artery. Because of the nature of the lesion not being typical plaque and its distance from the carotid bifurcation quite high he was a good candidate for TCAR.   The indications, techniques, risks and al the carotid artery to the appropriate level and performed an angiogram for mapping.   We then accessed the external carotid with the guidewire and advanced our in route NPS system and secured in place.     Flow reversal was established and checked in the pr [id number]               This shift: No intake/output data recorded.                   Scheduled Meds:   • acetaminophen  1,000 mg Oral Once   • famoTIDine  20 mg Oral Once   • Metoclopramide  10 mg Oral Once   • enoxaparin  40 mg Subcutaneous Daily 01/16/2020      (H) 01/16/2020     CA 9.2 01/16/2020     ALB 4.0 11/23/2019     ALKPHO 88 11/23/2019     BILT 0.5 11/23/2019     TP 8.1 11/23/2019     AST 18 11/23/2019     ALT 25 11/23/2019     PTT 44.1 (H) 01/15/2020     INR 1.11 01/15/2020     T4

## 2020-01-16 NOTE — PROGRESS NOTES
[facility]    Progress Note    Carlos Ordonez Patient Status:  Inpatient    1960 MRN [id number]   Location [facility] 2W/SW Attending Nanci Suh MD   [facility] Day # 1 PCP Shakira Lopez MD       Subjective:   Carlos Ordonez scds; lovenox subcut  Pain medication as needed  Increase activity up and ambulate  States quit smoking December 1st encouraged to continue with smoking cessation  Elevated WBCs 16.0 today no fevers. No SS of infection.  Will discuss with CV surgeon  Adonna Goltz

## 2020-01-16 NOTE — PLAN OF CARE
Pt a/o x4 speaking in full clear sentences. Denies any pain or pain meds. BP now stable. Nitro gtt off. Up to chair with meals. Swelling noted to left side of neck at incision site. Assessed by Dr. Yuki Bautista, no significant concern.    Will continue to mo baseline  Description  INTERVENTIONS:  - Continuous cardiac monitoring, monitor vital signs, obtain 12 lead EKG if indicated  - Evaluate effectiveness of antiarrhythmic and heart rate control medications as ordered  - Initiate emergency measures for life t

## 2020-01-16 NOTE — PROGRESS NOTES
Pt a/xo 4, speaking in full clear sentences. Ambulating with strong steady gait. Denies any CP, no SOB, no dysphagia, no dizziness, no severe pain. Wife at bedside. He is going home with wife. Pt has not additional questions or concerns.

## 2020-01-17 ENCOUNTER — PATIENT OUTREACH (OUTPATIENT)
Dept: CASE MANAGEMENT | Age: 60
End: 2020-01-17

## 2020-01-17 DIAGNOSIS — Z02.9 ENCOUNTERS FOR ADMINISTRATIVE PURPOSE: ICD-10-CM

## 2020-01-17 DIAGNOSIS — I65.22 LEFT CAROTID STENOSIS: ICD-10-CM

## 2020-01-17 PROCEDURE — 1111F DSCHRG MED/CURRENT MED MERGE: CPT

## 2020-01-17 RX ORDER — ACETAMINOPHEN 325 MG/1
325 TABLET ORAL EVERY 6 HOURS PRN
COMMUNITY
End: 2020-01-28 | Stop reason: ALTCHOICE

## 2020-01-17 NOTE — PROGRESS NOTES
Initial Post Discharge Follow Up   Discharge Date: 1/16/20  Contact Date: 1/17/2020    Consent Verification:  Assessment Completed With: Patient  HIPAA Verified?   Yes    Discharge Dx:  Left carotid stenosis       General:   • How have you been since you with you to make sure we are not missing anything? yes  • Are there any reasons that keep you from taking your medication as prescribed? No  Are you having any concerns with constipation? No    Referrals/orders at D/C:  Home Health/Services ordered at D/C?

## 2020-01-27 DIAGNOSIS — R42 DIZZINESS: Primary | ICD-10-CM

## 2020-01-28 ENCOUNTER — OFFICE VISIT (OUTPATIENT)
Dept: FAMILY MEDICINE CLINIC | Facility: CLINIC | Age: 60
End: 2020-01-28
Payer: COMMERCIAL

## 2020-01-28 VITALS
SYSTOLIC BLOOD PRESSURE: 134 MMHG | HEART RATE: 76 BPM | HEIGHT: 70 IN | WEIGHT: 212.19 LBS | BODY MASS INDEX: 30.38 KG/M2 | DIASTOLIC BLOOD PRESSURE: 77 MMHG

## 2020-01-28 DIAGNOSIS — I65.22 LEFT CAROTID STENOSIS: ICD-10-CM

## 2020-01-28 DIAGNOSIS — E78.5 HYPERLIPIDEMIA, UNSPECIFIED HYPERLIPIDEMIA TYPE: ICD-10-CM

## 2020-01-28 DIAGNOSIS — I25.10 CORONARY ARTERY DISEASE INVOLVING NATIVE CORONARY ARTERY OF NATIVE HEART WITHOUT ANGINA PECTORIS: ICD-10-CM

## 2020-01-28 DIAGNOSIS — I25.2 HISTORY OF HEART ATTACK: Primary | ICD-10-CM

## 2020-01-28 PROCEDURE — 99495 TRANSJ CARE MGMT MOD F2F 14D: CPT | Performed by: FAMILY MEDICINE

## 2020-01-28 NOTE — PROGRESS NOTES
HPI:    Roberto Justice is a 62year old male here today for hospital follow up.    He was discharged from Inpatient hospital, [facility]  to Home   Admission Date: 1/15/20   Discharge Date: 1/16/20  Hospital Discharge Diagnoses (since 12/29/2019) strong family history of carotid stenosis and CAD. The patient denies any chest pains or shortness of breath.  The patient denies any further focal neurological symptoms since his symptomology while driving.         Hospital Course:    Left carotid stenosis father; Colon Cancer in his paternal uncle; Heart Disease in his mother and sister; Heart Disorder in his father; Hypertension in his father; Other in his father and sister. He  reports that he quit smoking about 8 weeks ago.  He has a 60.00 pack-year smo intact, motor and sensory are grossly intact    ASSESSMENT/ PLAN:   1. History of heart attack      2. Coronary artery disease involving native coronary artery of native heart without angina pectoris  Doing well s/p angio/stent placement.  Continue atorvast

## 2020-02-15 ENCOUNTER — HOSPITAL ENCOUNTER (OUTPATIENT)
Dept: ULTRASOUND IMAGING | Facility: HOSPITAL | Age: 60
Discharge: HOME | End: 2020-02-15
Attending: NURSE PRACTITIONER
Payer: COMMERCIAL

## 2020-02-15 DIAGNOSIS — R42 DIZZINESS: ICD-10-CM

## 2020-02-15 PROCEDURE — 93880 EXTRACRANIAL BILAT STUDY: CPT | Performed by: NURSE PRACTITIONER

## 2020-06-15 RX ORDER — ATORVASTATIN CALCIUM 20 MG/1
20 TABLET, FILM COATED ORAL NIGHTLY
Qty: 90 TABLET | Refills: 4 | Status: SHIPPED | OUTPATIENT
Start: 2020-06-15 | End: 2021-01-04

## 2020-06-15 RX ORDER — ASPIRIN 81 MG/1
81 TABLET ORAL DAILY
Qty: 90 TABLET | Refills: 4 | Status: SHIPPED | OUTPATIENT
Start: 2020-06-15 | End: 2021-09-29

## 2020-11-09 ENCOUNTER — APPOINTMENT (OUTPATIENT)
Dept: LAB | Age: 60
End: 2020-11-09
Attending: PHYSICIAN ASSISTANT
Payer: COMMERCIAL

## 2020-11-09 ENCOUNTER — TELEMEDICINE (OUTPATIENT)
Dept: FAMILY MEDICINE CLINIC | Facility: CLINIC | Age: 60
End: 2020-11-09
Payer: COMMERCIAL

## 2020-11-09 ENCOUNTER — TELEPHONE (OUTPATIENT)
Dept: FAMILY MEDICINE CLINIC | Facility: CLINIC | Age: 60
End: 2020-11-09

## 2020-11-09 DIAGNOSIS — J39.9 UPPER RESPIRATORY DISEASE: ICD-10-CM

## 2020-11-09 DIAGNOSIS — J39.9 UPPER RESPIRATORY DISEASE: Primary | ICD-10-CM

## 2020-11-09 PROCEDURE — 99213 OFFICE O/P EST LOW 20 MIN: CPT | Performed by: PHYSICIAN ASSISTANT

## 2020-11-09 RX ORDER — ALBUTEROL SULFATE 90 UG/1
2 AEROSOL, METERED RESPIRATORY (INHALATION) EVERY 4 HOURS PRN
Qty: 18 G | Refills: 1 | Status: SHIPPED | OUTPATIENT
Start: 2020-11-09 | End: 2020-12-05

## 2020-11-09 NOTE — TELEPHONE ENCOUNTER
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

## 2020-11-10 NOTE — PROGRESS NOTES
HPI: HPI    I spoke Nena Zhou by telephone , verified date of birth, and discussed current concerns. Onset/duration of current symptoms:4 days.      Common COVID-19 symptoms per CDC: CDC Clinical Guidance  • Fever:     Yes [x]     No []       • Coug 2018    repeat colonoscopy 2023   • COPD (chronic obstructive pulmonary disease) (HCC)    • Coronary atherosclerosis    • Decorative tattoo    • Exposure to hepatitis 1971    Hep A   • Fetal and  jaundice When I had HEP   • Hemorrhoids  2-4 times a month        Drinks per session: 1 or 2        Binge frequency: Monthly        Comment: socially      Drug use: No      Sexual activity: Not on file    Lifestyle      Physical activity        Days per week: Not on file        Minutes per sessio time. He appears well-developed and well-nourished. HENT:   Head: Normocephalic and atraumatic. Neurological: He is alert and oriented to person, place, and time. Psychiatric: He has a normal mood and affect.        Assessment and Plan[de-identified]     Problem L

## 2020-11-11 ENCOUNTER — PATIENT MESSAGE (OUTPATIENT)
Dept: FAMILY MEDICINE CLINIC | Facility: CLINIC | Age: 60
End: 2020-11-11

## 2020-11-11 NOTE — TELEPHONE ENCOUNTER
From: Lasha Barfield  To: Rojelio Wong MD  Sent: 11/11/2020 1:18 PM CST  Subject: Test Results Question    I had a video consult with Josy Peña PA-C who placed an order for Covid testing in the [facility] lab, which I did.  I received the results w

## 2020-11-12 ENCOUNTER — TELEPHONE (OUTPATIENT)
Dept: FAMILY MEDICINE CLINIC | Facility: CLINIC | Age: 60
End: 2020-11-12

## 2020-11-12 NOTE — TELEPHONE ENCOUNTER
----- Message from Vickey Turner sent at 11/12/2020  9:39 AM CST -----  Regarding: RE: Test Results Question  Contact: [phone]  Celso Yenni, or whomever reads this.  My employer has requested a dated letter stating that I have tested positive on dillon

## 2020-11-12 NOTE — TELEPHONE ENCOUNTER
Home monitoring team; please f/u.        To: EM JAIME Carmichael      From: Rudi Song      Created: 11/12/2020 9:39 AM         *-*-*This message has not been handled. *-*-*     Danielle Bautista, or whomever reads this.  My employer has requested a dated letter st

## 2020-11-12 NOTE — TELEPHONE ENCOUNTER
Triage team will monitor patient . Please DO NOT close this encounter    What  was your temp today? - 99.4    How did you take your temp?     with an oral thermometer    Are you feeling short of breath today?    No      Is the shortness of breath better, t

## 2020-11-12 NOTE — TELEPHONE ENCOUNTER
See new encounter. To: FEDERICO Ludwig      From: Nena Zhou      Created: 11/12/2020 9:39 AM      *-*-*This message has not been handled. *-*-*  Danielle Bautista, or whomever reads this.  My employer has requested a dated letter stating that I have tested

## 2020-11-16 NOTE — TELEPHONE ENCOUNTER
Would like another call back on 11/18/20; already received return to work note from Dr Melissa Gracia for 11/19/20, assuming he continues with no fever and other symptoms improving.     What  was your temp today? 98.3 F    How did you take your temp?     with an oral

## 2020-11-18 NOTE — TELEPHONE ENCOUNTER
Per patient symptoms started on 11/5/20 and was tested positive on 11/9/20. Per patient, symptoms improved than before. Informed patient that he is off isolation now per CDC criteria.   Informed that even he is off isolation, he needs to follow CDC guidel

## 2020-11-20 RX ORDER — ALBUTEROL SULFATE 90 UG/1
2 AEROSOL, METERED RESPIRATORY (INHALATION) EVERY 4 HOURS PRN
Qty: 18 G | Refills: 1 | OUTPATIENT
Start: 2020-11-20

## 2020-12-14 ENCOUNTER — HOSPITAL ENCOUNTER (OUTPATIENT)
Dept: GENERAL RADIOLOGY | Facility: HOSPITAL | Age: 60
Discharge: HOME | End: 2020-12-14
Attending: INTERNAL MEDICINE
Payer: COMMERCIAL

## 2020-12-14 DIAGNOSIS — Z01.818 PREOP TESTING: ICD-10-CM

## 2020-12-14 DIAGNOSIS — I65.29 CAROTID STENOSIS: ICD-10-CM

## 2020-12-14 PROCEDURE — 71046 X-RAY EXAM CHEST 2 VIEWS: CPT | Performed by: INTERNAL MEDICINE

## 2020-12-15 ENCOUNTER — PATIENT MESSAGE (OUTPATIENT)
Dept: FAMILY MEDICINE CLINIC | Facility: CLINIC | Age: 60
End: 2020-12-15

## 2020-12-15 NOTE — TELEPHONE ENCOUNTER
Patient informed of provider's response/recommendations below and voiced understating and agrees. Appt scheduled for 12/19/20 since  states can only come on a Saturday.

## 2020-12-15 NOTE — TELEPHONE ENCOUNTER
I did not order that chest x-ray. Yes that can occur post covid and can be abnormal for some time. Please have him follow up with me to review - res 24 ok in office.   Was supposed to have follow up ct scan lungs due to annual screening but I need to order

## 2020-12-15 NOTE — TELEPHONE ENCOUNTER
From: Thuy Ray  To: Chares Kehr, MD  Sent: 12/15/2020 8:07 AM CST  Subject: Test Results Cecilia Garcia,    So, yesterday I went for the chest x-ray I was instructed to get after a year and based on the results it reads as if I've beatris

## 2020-12-19 ENCOUNTER — OFFICE VISIT (OUTPATIENT)
Dept: FAMILY MEDICINE CLINIC | Facility: CLINIC | Age: 60
End: 2020-12-19
Payer: COMMERCIAL

## 2020-12-19 VITALS
SYSTOLIC BLOOD PRESSURE: 152 MMHG | TEMPERATURE: 98 F | DIASTOLIC BLOOD PRESSURE: 88 MMHG | BODY MASS INDEX: 33.73 KG/M2 | WEIGHT: 235.63 LBS | HEIGHT: 70 IN | HEART RATE: 82 BPM

## 2020-12-19 DIAGNOSIS — E78.5 HYPERLIPIDEMIA, UNSPECIFIED HYPERLIPIDEMIA TYPE: ICD-10-CM

## 2020-12-19 DIAGNOSIS — Z72.0 TOBACCO USE: Primary | ICD-10-CM

## 2020-12-19 DIAGNOSIS — Z12.5 SCREENING FOR PROSTATE CANCER: ICD-10-CM

## 2020-12-19 DIAGNOSIS — I10 ESSENTIAL HYPERTENSION: ICD-10-CM

## 2020-12-19 DIAGNOSIS — N40.0 PROSTATE ENLARGEMENT: ICD-10-CM

## 2020-12-19 DIAGNOSIS — I25.2 HISTORY OF HEART ATTACK: ICD-10-CM

## 2020-12-19 PROCEDURE — 3079F DIAST BP 80-89 MM HG: CPT | Performed by: FAMILY MEDICINE

## 2020-12-19 PROCEDURE — 99214 OFFICE O/P EST MOD 30 MIN: CPT | Performed by: FAMILY MEDICINE

## 2020-12-19 PROCEDURE — 3008F BODY MASS INDEX DOCD: CPT | Performed by: FAMILY MEDICINE

## 2020-12-19 PROCEDURE — 3077F SYST BP >= 140 MM HG: CPT | Performed by: FAMILY MEDICINE

## 2020-12-19 RX ORDER — LISINOPRIL 10 MG/1
10 TABLET ORAL DAILY
Qty: 90 TABLET | Refills: 3 | Status: SHIPPED | OUTPATIENT
Start: 2020-12-19 | End: 2021-12-03

## 2020-12-19 NOTE — PROGRESS NOTES
Thuy Ray is a 56year old male. Patient presents with:  Test Results: chest xray    HPI:   Pt here for CXR follow up. Concerned about the CXR results.    CXR done 12/14  CONCLUSION:      Curvilinear opacities in both lung bases with patchy areas of gr Smoker        Packs/day: 1.50        Years: 40.00        Pack years: 61        Quit date: 2019        Years since quittin.0      Smokeless tobacco: Former User    Alcohol use:  Yes      Alcohol/week: 2.0 standard drinks      Frequency: 2-4 times a

## 2020-12-29 ENCOUNTER — APPOINTMENT (OUTPATIENT)
Dept: ULTRASOUND IMAGING | Age: 60
End: 2020-12-29
Attending: PHYSICIAN ASSISTANT
Payer: COMMERCIAL

## 2020-12-29 ENCOUNTER — HOSPITAL ENCOUNTER (OUTPATIENT)
Dept: CT IMAGING | Facility: HOSPITAL | Age: 60
Discharge: HOME | End: 2020-12-29
Attending: FAMILY MEDICINE
Payer: COMMERCIAL

## 2020-12-29 ENCOUNTER — HOSPITAL ENCOUNTER (OUTPATIENT)
Dept: ULTRASOUND IMAGING | Facility: HOSPITAL | Age: 60
Discharge: HOME | End: 2020-12-29
Attending: PHYSICIAN ASSISTANT
Payer: COMMERCIAL

## 2020-12-29 ENCOUNTER — TELEPHONE (OUTPATIENT)
Dept: FAMILY MEDICINE CLINIC | Facility: CLINIC | Age: 60
End: 2020-12-29

## 2020-12-29 ENCOUNTER — LAB ENCOUNTER (OUTPATIENT)
Dept: LAB | Facility: HOSPITAL | Age: 60
End: 2020-12-29
Attending: FAMILY MEDICINE
Payer: COMMERCIAL

## 2020-12-29 DIAGNOSIS — Z12.5 SCREENING FOR PROSTATE CANCER: ICD-10-CM

## 2020-12-29 DIAGNOSIS — I65.23 BILATERAL CAROTID ARTERY STENOSIS: ICD-10-CM

## 2020-12-29 DIAGNOSIS — I25.10 CORONARY ARTERY DISEASE INVOLVING NATIVE CORONARY ARTERY OF NATIVE HEART WITHOUT ANGINA PECTORIS: ICD-10-CM

## 2020-12-29 DIAGNOSIS — Z72.0 TOBACCO USE: ICD-10-CM

## 2020-12-29 DIAGNOSIS — E78.5 HYPERLIPIDEMIA, UNSPECIFIED HYPERLIPIDEMIA TYPE: ICD-10-CM

## 2020-12-29 LAB
ALBUMIN SERPL-MCNC: 4.3 G/DL (ref 3.4–5)
ALBUMIN/GLOB SERPL: 1 {RATIO} (ref 1–2)
ALP LIVER SERPL-CCNC: 92 U/L
ALT SERPL-CCNC: 51 U/L
ANION GAP SERPL CALC-SCNC: 6 MMOL/L (ref 0–18)
AST SERPL-CCNC: 34 U/L (ref 15–37)
BASOPHILS # BLD AUTO: 0.09 X10(3) UL (ref 0–0.2)
BASOPHILS NFR BLD AUTO: 1 %
BILIRUB SERPL-MCNC: 0.9 MG/DL (ref 0.1–2)
BUN BLD-MCNC: 14 MG/DL (ref 7–18)
BUN/CREAT SERPL: 12.3 (ref 10–20)
CALCIUM BLD-MCNC: 9.4 MG/DL (ref 8.5–10.1)
CHLORIDE SERPL-SCNC: 104 MMOL/L (ref 98–112)
CHOLEST SMN-MCNC: 260 MG/DL (ref ?–200)
CO2 SERPL-SCNC: 27 MMOL/L (ref 21–32)
COMPLEXED PSA SERPL-MCNC: 1.08 NG/ML (ref ?–4)
CREAT BLD-MCNC: 1.14 MG/DL
DEPRECATED RDW RBC AUTO: 50.1 FL (ref 35.1–46.3)
EOSINOPHIL # BLD AUTO: 0.23 X10(3) UL (ref 0–0.7)
EOSINOPHIL NFR BLD AUTO: 2.6 %
ERYTHROCYTE [DISTWIDTH] IN BLOOD BY AUTOMATED COUNT: 13.8 % (ref 11–15)
EST. AVERAGE GLUCOSE BLD GHB EST-MCNC: 166 MG/DL (ref 68–126)
GLOBULIN PLAS-MCNC: 4.3 G/DL (ref 2.8–4.4)
GLUCOSE BLD-MCNC: 105 MG/DL (ref 70–99)
HBA1C MFR BLD HPLC: 7.4 % (ref ?–5.7)
HCT VFR BLD AUTO: 50.2 %
HDLC SERPL-MCNC: 52 MG/DL (ref 40–59)
HGB BLD-MCNC: 16.8 G/DL
IMM GRANULOCYTES # BLD AUTO: 0.03 X10(3) UL (ref 0–1)
IMM GRANULOCYTES NFR BLD: 0.3 %
LDLC SERPL CALC-MCNC: 146 MG/DL (ref ?–100)
LYMPHOCYTES # BLD AUTO: 2.54 X10(3) UL (ref 1–4)
LYMPHOCYTES NFR BLD AUTO: 28.5 %
M PROTEIN MFR SERPL ELPH: 8.6 G/DL (ref 6.4–8.2)
MCH RBC QN AUTO: 32.6 PG (ref 26–34)
MCHC RBC AUTO-ENTMCNC: 33.5 G/DL (ref 31–37)
MCV RBC AUTO: 97.5 FL
MONOCYTES # BLD AUTO: 0.67 X10(3) UL (ref 0.1–1)
MONOCYTES NFR BLD AUTO: 7.5 %
NEUTROPHILS # BLD AUTO: 5.35 X10 (3) UL (ref 1.5–7.7)
NEUTROPHILS # BLD AUTO: 5.35 X10(3) UL (ref 1.5–7.7)
NEUTROPHILS NFR BLD AUTO: 60.1 %
NONHDLC SERPL-MCNC: 208 MG/DL (ref ?–130)
OSMOLALITY SERPL CALC.SUM OF ELEC: 285 MOSM/KG (ref 275–295)
PATIENT FASTING Y/N/NP: YES
PATIENT FASTING Y/N/NP: YES
PLATELET # BLD AUTO: 219 10(3)UL (ref 150–450)
POTASSIUM SERPL-SCNC: 4 MMOL/L (ref 3.5–5.1)
RBC # BLD AUTO: 5.15 X10(6)UL
SODIUM SERPL-SCNC: 137 MMOL/L (ref 136–145)
T4 FREE SERPL-MCNC: 0.4 NG/DL (ref 0.8–1.7)
TRIGL SERPL-MCNC: 308 MG/DL (ref 30–149)
TSI SER-ACNC: 103 MIU/ML (ref 0.36–3.74)
VLDLC SERPL CALC-MCNC: 62 MG/DL (ref 0–30)
WBC # BLD AUTO: 8.9 X10(3) UL (ref 4–11)

## 2020-12-29 PROCEDURE — 84443 ASSAY THYROID STIM HORMONE: CPT

## 2020-12-29 PROCEDURE — 93880 EXTRACRANIAL BILAT STUDY: CPT | Performed by: PHYSICIAN ASSISTANT

## 2020-12-29 PROCEDURE — 80053 COMPREHEN METABOLIC PANEL: CPT

## 2020-12-29 PROCEDURE — 84439 ASSAY OF FREE THYROXINE: CPT

## 2020-12-29 PROCEDURE — 80061 LIPID PANEL: CPT

## 2020-12-29 PROCEDURE — 36415 COLL VENOUS BLD VENIPUNCTURE: CPT

## 2020-12-29 PROCEDURE — 83036 HEMOGLOBIN GLYCOSYLATED A1C: CPT

## 2020-12-29 PROCEDURE — 85025 COMPLETE CBC W/AUTO DIFF WBC: CPT

## 2020-12-29 NOTE — TELEPHONE ENCOUNTER
Patient has an appointment scheduled today at 2:30pm to complete all pending blood work ordered by Dr. Asim Gomez. Patient would like to now if he needs to be fasting. Please advise.

## 2021-01-04 DIAGNOSIS — E78.5 HYPERLIPIDEMIA, UNSPECIFIED HYPERLIPIDEMIA TYPE: Primary | ICD-10-CM

## 2021-01-04 DIAGNOSIS — E03.9 HYPOTHYROIDISM, UNSPECIFIED TYPE: ICD-10-CM

## 2021-01-04 RX ORDER — LEVOTHYROXINE SODIUM 0.1 MG/1
100 TABLET ORAL DAILY
Qty: 90 TABLET | Refills: 3 | Status: SHIPPED | OUTPATIENT
Start: 2021-01-04 | End: 2021-01-11

## 2021-01-04 RX ORDER — ROSUVASTATIN CALCIUM 40 MG/1
40 TABLET, COATED ORAL NIGHTLY
Qty: 90 TABLET | Refills: 4 | Status: SHIPPED | OUTPATIENT
Start: 2021-01-04 | End: 2021-01-11

## 2021-01-04 NOTE — PROGRESS NOTES
Son [place] - You have very low thyroid levels - meaning you have hypothyroidism. I am starting you on treatment levothyroxine 100 mg in am on empty stomach - wait 30 minutes to eat or take other medications.  Also I am changing your cholesterol med to cresto

## 2021-01-04 NOTE — PROGRESS NOTES
Son Carlos - no concerning lung masses. Shows the known emphysema from prior smoking use. Also shows the known coronary artery disease.  You cholesterol is still high so will change to crestor 40 mg - it is a bit stronger. - Dr. Pawan Smith

## 2021-01-09 ENCOUNTER — PATIENT MESSAGE (OUTPATIENT)
Dept: FAMILY MEDICINE CLINIC | Facility: CLINIC | Age: 61
End: 2021-01-09

## 2021-01-10 ENCOUNTER — PATIENT MESSAGE (OUTPATIENT)
Dept: FAMILY MEDICINE CLINIC | Facility: CLINIC | Age: 61
End: 2021-01-10

## 2021-01-10 NOTE — TELEPHONE ENCOUNTER
From: Larisa Shrestha  To: Fatemeh Elizondo MD  Sent: 1/9/2021 4:00 PM CST  Subject: Prescription Question    Hello, [facility] has been trying to contact someone there about my new prescriptions, but haven't received a response, so they can't mail out

## 2021-01-11 RX ORDER — LEVOTHYROXINE SODIUM 0.1 MG/1
100 TABLET ORAL DAILY
Qty: 90 TABLET | Refills: 3 | Status: SHIPPED | OUTPATIENT
Start: 2021-01-11 | End: 2021-12-14

## 2021-01-11 RX ORDER — ROSUVASTATIN CALCIUM 40 MG/1
40 TABLET, COATED ORAL NIGHTLY
Qty: 90 TABLET | Refills: 4 | Status: SHIPPED | OUTPATIENT
Start: 2021-01-11

## 2021-01-11 NOTE — TELEPHONE ENCOUNTER
From: Racheal Ding  To: Kevin Rothman MD  Sent: 1/10/2021 12:41 PM CST  Subject: Prescription Question    Not sure why the correspondence was closed before I responded. I just asked the question yesterday. Here is your response.     Hi,   What medicat

## 2021-01-16 ENCOUNTER — OFFICE VISIT (OUTPATIENT)
Dept: FAMILY MEDICINE CLINIC | Facility: CLINIC | Age: 61
End: 2021-01-16
Payer: COMMERCIAL

## 2021-01-16 VITALS
WEIGHT: 226.81 LBS | DIASTOLIC BLOOD PRESSURE: 71 MMHG | SYSTOLIC BLOOD PRESSURE: 128 MMHG | HEART RATE: 63 BPM | HEIGHT: 70 IN | TEMPERATURE: 97 F | BODY MASS INDEX: 32.47 KG/M2

## 2021-01-16 DIAGNOSIS — I10 ESSENTIAL HYPERTENSION: ICD-10-CM

## 2021-01-16 DIAGNOSIS — E03.9 HYPOTHYROIDISM, UNSPECIFIED TYPE: ICD-10-CM

## 2021-01-16 DIAGNOSIS — E11.9 TYPE 2 DIABETES MELLITUS WITHOUT COMPLICATION, WITHOUT LONG-TERM CURRENT USE OF INSULIN (HCC): ICD-10-CM

## 2021-01-16 DIAGNOSIS — Z00.00 ROUTINE MEDICAL EXAM: ICD-10-CM

## 2021-01-16 DIAGNOSIS — E78.5 HYPERLIPIDEMIA, UNSPECIFIED HYPERLIPIDEMIA TYPE: Primary | ICD-10-CM

## 2021-01-16 PROCEDURE — 3074F SYST BP LT 130 MM HG: CPT | Performed by: FAMILY MEDICINE

## 2021-01-16 PROCEDURE — 3008F BODY MASS INDEX DOCD: CPT | Performed by: FAMILY MEDICINE

## 2021-01-16 PROCEDURE — 90471 IMMUNIZATION ADMIN: CPT | Performed by: FAMILY MEDICINE

## 2021-01-16 PROCEDURE — 3078F DIAST BP <80 MM HG: CPT | Performed by: FAMILY MEDICINE

## 2021-01-16 PROCEDURE — 99396 PREV VISIT EST AGE 40-64: CPT | Performed by: FAMILY MEDICINE

## 2021-01-16 PROCEDURE — 90686 IIV4 VACC NO PRSV 0.5 ML IM: CPT | Performed by: FAMILY MEDICINE

## 2021-01-16 NOTE — PROGRESS NOTES
Susannah Herrera is a 56year old male who presents for a complete physical exam.   HPI:   Recently started on levothyroxine only a few days ago. Repeat labs in 2 months. Reports some improvements. Has lost almost 10 lbs in past 3 weeks.  Reports changing h COLONOSCOPY  09/19/2018   • TONSILLECTOMY     • VASECTOMY        Family History   Problem Relation Age of Onset   • Heart Disorder Father    • Hypertension Father    • Cancer Father         Prostate Cancer -    • Other (Other) Father         Alzheimer's without murmur  GI: good BS's,no masses, HSM or tenderness  MUSCULOSKELETAL: back is not tender,FROM of the back  EXTREMITIES: no cyanosis, clubbing or edema  NEURO: Oriented times three,cranial nerves are intact,motor and sensory are grossly intact    ASS

## 2021-02-07 ENCOUNTER — PATIENT MESSAGE (OUTPATIENT)
Dept: FAMILY MEDICINE CLINIC | Facility: CLINIC | Age: 61
End: 2021-02-07

## 2021-02-08 NOTE — TELEPHONE ENCOUNTER
From: Rudi Song  To: Lyssa Stark MD  Sent: 2/7/2021 10:26 AM CST  Subject: Non-Urgent Medical Question    I received a message in [employer] and was informed based on the info provided by the message, that I am a front- eligible for a Cov

## 2021-02-11 DIAGNOSIS — Z23 NEED FOR VACCINATION: ICD-10-CM

## 2021-02-14 ENCOUNTER — IMMUNIZATION (OUTPATIENT)
Dept: LAB | Age: 61
End: 2021-02-14
Attending: HOSPITALIST
Payer: COMMERCIAL

## 2021-02-14 DIAGNOSIS — Z23 NEED FOR VACCINATION: Primary | ICD-10-CM

## 2021-02-14 PROCEDURE — 0001A SARSCOV2 VAC 30MCG/0.3ML IM: CPT

## 2021-03-07 ENCOUNTER — IMMUNIZATION (OUTPATIENT)
Dept: LAB | Age: 61
End: 2021-03-07
Attending: HOSPITALIST
Payer: COMMERCIAL

## 2021-03-07 DIAGNOSIS — Z23 NEED FOR VACCINATION: Primary | ICD-10-CM

## 2021-03-07 PROCEDURE — 0002A SARSCOV2 VAC 30MCG/0.3ML IM: CPT

## 2021-04-17 ENCOUNTER — LAB ENCOUNTER (OUTPATIENT)
Dept: LAB | Age: 61
End: 2021-04-17
Attending: FAMILY MEDICINE
Payer: COMMERCIAL

## 2021-04-17 DIAGNOSIS — E78.5 HYPERLIPIDEMIA, UNSPECIFIED HYPERLIPIDEMIA TYPE: ICD-10-CM

## 2021-04-17 DIAGNOSIS — E11.9 TYPE 2 DIABETES MELLITUS WITHOUT COMPLICATION, WITHOUT LONG-TERM CURRENT USE OF INSULIN (HCC): ICD-10-CM

## 2021-04-17 DIAGNOSIS — E03.9 HYPOTHYROIDISM, UNSPECIFIED TYPE: ICD-10-CM

## 2021-04-17 PROCEDURE — 84439 ASSAY OF FREE THYROXINE: CPT

## 2021-04-17 PROCEDURE — 36415 COLL VENOUS BLD VENIPUNCTURE: CPT

## 2021-04-17 PROCEDURE — 80053 COMPREHEN METABOLIC PANEL: CPT

## 2021-04-17 PROCEDURE — 83036 HEMOGLOBIN GLYCOSYLATED A1C: CPT

## 2021-04-17 PROCEDURE — 84443 ASSAY THYROID STIM HORMONE: CPT

## 2021-04-19 NOTE — PROGRESS NOTES
[facility] job! You are now in pre-diabetes range and not diabetic.  Your thyroid levels are better with the medications so continue the same doses. - Dr. Ha Erm

## 2021-04-22 ENCOUNTER — TELEPHONE (OUTPATIENT)
Dept: FAMILY MEDICINE CLINIC | Facility: CLINIC | Age: 61
End: 2021-04-22

## 2021-04-22 NOTE — TELEPHONE ENCOUNTER
Patient states he received a reminder to schedule his diabetic eye exam, patient states he is not diabetic and would like to know if he needs to get this completed or why is he getting these messages. Patient requesting a call back.

## 2021-05-01 ENCOUNTER — LAB ENCOUNTER (OUTPATIENT)
Dept: LAB | Age: 61
End: 2021-05-01
Attending: FAMILY MEDICINE
Payer: COMMERCIAL

## 2021-05-01 DIAGNOSIS — E78.5 HYPERLIPIDEMIA, UNSPECIFIED HYPERLIPIDEMIA TYPE: ICD-10-CM

## 2021-05-01 PROCEDURE — 80061 LIPID PANEL: CPT

## 2021-05-01 PROCEDURE — 36415 COLL VENOUS BLD VENIPUNCTURE: CPT

## 2021-05-01 PROCEDURE — 83695 ASSAY OF LIPOPROTEIN(A): CPT

## 2021-05-09 NOTE — PROGRESS NOTES
Son Carlos - Your total cholesterol levels and triglycerides are much better with the medications - continue the same. Your Lipoprotein levels being elevated mean we need to be aggressive with treating your cholesterol.  Stay on the higher dose of crestor at

## 2021-09-29 RX ORDER — ASPIRIN 81 MG/1
81 TABLET ORAL DAILY
Qty: 90 TABLET | Refills: 0 | Status: SHIPPED | OUTPATIENT
Start: 2021-09-29 | End: 2021-12-06

## 2021-09-29 NOTE — TELEPHONE ENCOUNTER
90 day refill given on 09/29/21, appointment needed for further refills. Please review. Protocol failed / No protocol.    Requested Prescriptions   Pending Prescriptions Disp Refills    ASPIRIN LOW DOSE 81 MG Oral Tab EC [Pharmacy Med Name: ASPIRIN EC TABS 81MG] 90 tablet 3     Sig: TAKE 1 TABLET DAILY        Aspirin Protocol Failed - 9/28/2021  8:54 PM        Failed - Appointment in past 6 or next 3 months                Recent Outpatient Visits              8 months ago Hyperlipidemia, unspecified hyperlipidemia type    Berto Wiley MD    Office Visit    9 months ago Tobacco use    Berto Wiley MD    Office Visit    10 months ago Upper respiratory disease    [facility], [facility], [address], Braulio Fried PA-C    Telemedicine    1 year ago History of heart attack    Berto Wiley MD    Office Visit    1 year ago Bilateral carotid artery stenosis    [facility], Sherie Gomez, Sandra Canales MD    Office Visit

## 2021-12-03 ENCOUNTER — TELEPHONE (OUTPATIENT)
Dept: FAMILY MEDICINE CLINIC | Facility: CLINIC | Age: 61
End: 2021-12-03

## 2021-12-03 DIAGNOSIS — E78.5 HYPERLIPIDEMIA, UNSPECIFIED HYPERLIPIDEMIA TYPE: Primary | ICD-10-CM

## 2021-12-03 DIAGNOSIS — E11.9 TYPE 2 DIABETES MELLITUS WITHOUT COMPLICATION, WITHOUT LONG-TERM CURRENT USE OF INSULIN (HCC): ICD-10-CM

## 2021-12-03 DIAGNOSIS — Z00.00 ROUTINE MEDICAL EXAM: ICD-10-CM

## 2021-12-03 DIAGNOSIS — I10 ESSENTIAL HYPERTENSION: ICD-10-CM

## 2021-12-03 RX ORDER — LISINOPRIL 10 MG/1
10 TABLET ORAL DAILY
Qty: 90 TABLET | Refills: 0 | Status: SHIPPED | OUTPATIENT
Start: 2021-12-03

## 2021-12-03 NOTE — TELEPHONE ENCOUNTER
Protocol failed. 90 day refill given on 12/03/21, appointment needed for further refills.       Requested Prescriptions   Pending Prescriptions Disp Refills    LISINOPRIL 10 MG Oral Tab [Pharmacy Med Name: LISINOPRIL TABS 10MG] 90 tablet 3     Sig: TAKE

## 2021-12-03 NOTE — TELEPHONE ENCOUNTER
Patient is due for an office visit. Please call patient to schedule an appointment in order to receive any further refills after this. [facility] message sent. 90 day refill given on 12/03/21, appointment needed for further refills.

## 2021-12-06 RX ORDER — ASPIRIN 81 MG/1
81 TABLET ORAL DAILY
Qty: 90 TABLET | Refills: 3 | Status: SHIPPED | OUTPATIENT
Start: 2021-12-06

## 2021-12-06 NOTE — TELEPHONE ENCOUNTER
Medication pended for approval          Refill passed per [facility], [facility] protocol.   Requested Prescriptions   Pending Prescriptions Disp Refills    ASPIRIN LOW DOSE 81 MG Oral Tab EC [Pharmacy Med Name: ASPIRIN EC TABS 81MG] 90 tablet 3     Sig: TAKE 1 TA

## 2021-12-14 RX ORDER — LEVOTHYROXINE SODIUM 0.1 MG/1
100 TABLET ORAL DAILY
Qty: 90 TABLET | Refills: 1 | Status: SHIPPED | OUTPATIENT
Start: 2021-12-14

## 2021-12-14 NOTE — TELEPHONE ENCOUNTER
"Chief Complaint   Patient presents with   • Thyroid Carcinoma     2015, metastatic papillary   • Hypothyroidism     Post thyroidectomy        HPI:    See assessment and recommendations below    ROS:  All other systems reported as negative or unchanged since last exam      Allergies: No Known Allergies    Current medicines including changes today:  Current Outpatient Medications   Medication Sig Dispense Refill   • Multiple Vitamins-Minerals (MULTIVITAMIN GUMMIES ADULT PO) Take  by mouth.     • levothyroxine (SYNTHROID) 137 MCG Tab Take 1 Tab by mouth Every morning on an empty stomach. 90 Tab 3   • Etonogestrel (NEXPLANON SC) Inject  as instructed.       No current facility-administered medications for this visit.         Past Medical History:   Diagnosis Date   • Papillary carcinoma of thyroid (HCC) 2012    L 3.4 cm / 4/7 nodes + [id number]   • Papillary carcinoma of thyroid (HCC) 2012    Rx 131 157 mCi   • Postsurgical hypothyroidism 2012   • Prediabetes     A1c = 6.4       PHYSICAL EXAM:    /60 (BP Location: Left arm, Patient Position: Sitting, BP Cuff Size: Adult)   Pulse 93   Ht 1.626 m (5' 4.02\")   Wt 78.8 kg (173 lb 12.8 oz)   SpO2 98%   BMI 29.82 kg/m²     Gen. overweight but otherwise appears healthy     Skin   appropriate for sex and age    HEENT  unremarkable    Neck   no palpable nodules or masses in the neck or supraclavicular areas    Heart  regular    Extremities  no edema    Neuro  gait and station normal, no tremor    Psych  appropriate, calm, pleasant    ASSESSMENT AND RECOMMENDATIONS    1. Hypothyroidism, postsurgical, 2015             Clinically euthyroid taking levothyroxine 137 mcg 6 days/week and 1/2 tablet 1 day/week              TSH purposely suppressed at 0.03 with free thyroxine slightly elevated at 1.5.               She tolerates this dose well  - FREE THYROXINE; Future  - TSH; Future    2. Papillary carcinoma of thyroid (HCC)       Metastatic papillary carcinoma.            " Refilled per [facility], [facility] protocol.   Requested Prescriptions   Pending Prescriptions Disp Refills    LEVOTHYROXINE 100 MCG Oral Tab [Pharmacy Med Name: L-THYROXINE (SYNTHROID) TABS 100MCG] 90 tablet 3     Sig: TAKE 1 TABLET DAILY        Thyroid Medicati Please refer to my progress note of January 22, 2018 for details             Thyroidectomy followed by I-131 ablation            No evidence of recurrence since.            Soft tissue neck ultrasound 2017- for suspicious lesions            Current thyroglobulin 0.1 without interfering antibodies stable             No new developments in the neck or thyroid area. Not aware of any new nodules or masses. No voice change, difficulty swallowing, or breathing  - ANTITHYROGLOBULIN AB; Future  - THYROGLOBULIN, QT; Future      DISPOSITION: No change indicated return in 6 months.                            Consider repeating soft tissue ultrasound.  If negative thyroid dose      Josse Mahoney M.D.    Copies to: Shelbie Chapman M.D. [phone]

## 2022-01-21 ENCOUNTER — HOSPITAL ENCOUNTER (OUTPATIENT)
Dept: ULTRASOUND IMAGING | Age: 62
Discharge: HOME | End: 2022-01-21
Attending: PHYSICIAN ASSISTANT
Payer: COMMERCIAL

## 2022-01-21 DIAGNOSIS — I65.23 BILATERAL CAROTID ARTERY STENOSIS: ICD-10-CM

## 2022-01-21 PROCEDURE — 93880 EXTRACRANIAL BILAT STUDY: CPT | Performed by: PHYSICIAN ASSISTANT

## 2022-02-26 ENCOUNTER — LAB ENCOUNTER (OUTPATIENT)
Dept: LAB | Age: 62
End: 2022-02-26
Attending: FAMILY MEDICINE
Payer: COMMERCIAL

## 2022-02-26 DIAGNOSIS — Z00.00 ROUTINE MEDICAL EXAM: ICD-10-CM

## 2022-02-26 LAB
ALBUMIN SERPL-MCNC: 4.2 G/DL (ref 3.4–5)
ALBUMIN/GLOB SERPL: 1.3 {RATIO} (ref 1–2)
ALP LIVER SERPL-CCNC: 65 U/L
ALT SERPL-CCNC: 30 U/L
ANION GAP SERPL CALC-SCNC: 7 MMOL/L (ref 0–18)
AST SERPL-CCNC: 24 U/L (ref 15–37)
BASOPHILS # BLD AUTO: 0.08 X10(3) UL (ref 0–0.2)
BILIRUB SERPL-MCNC: 0.7 MG/DL (ref 0.1–2)
BUN BLD-MCNC: 14 MG/DL (ref 7–18)
BUN/CREAT SERPL: 12.4 (ref 10–20)
CALCIUM BLD-MCNC: 9.7 MG/DL (ref 8.5–10.1)
CHLORIDE SERPL-SCNC: 110 MMOL/L (ref 98–112)
CHOLEST SERPL-MCNC: 130 MG/DL (ref ?–200)
CO2 SERPL-SCNC: 25 MMOL/L (ref 21–32)
CREAT BLD-MCNC: 1.13 MG/DL
CREAT UR-SCNC: 236 MG/DL
DEPRECATED RDW RBC AUTO: 47.3 FL (ref 35.1–46.3)
EOSINOPHIL NFR BLD AUTO: 2.1 %
ERYTHROCYTE [DISTWIDTH] IN BLOOD BY AUTOMATED COUNT: 13.2 % (ref 11–15)
EST. AVERAGE GLUCOSE BLD GHB EST-MCNC: 134 MG/DL (ref 68–126)
FASTING PATIENT LIPID ANSWER: YES
FASTING STATUS PATIENT QL REPORTED: YES
GLOBULIN PLAS-MCNC: 3.3 G/DL (ref 2.8–4.4)
GLUCOSE BLD-MCNC: 109 MG/DL (ref 70–99)
HBA1C MFR BLD: 6.3 % (ref ?–5.7)
HDLC SERPL-MCNC: 46 MG/DL (ref 40–59)
HGB BLD-MCNC: 16.2 G/DL
IMM GRANULOCYTES # BLD AUTO: 0.01 X10(3) UL (ref 0–1)
IMM GRANULOCYTES NFR BLD: 0.1 %
LDLC SERPL CALC-MCNC: 57 MG/DL (ref ?–100)
LYMPHOCYTES # BLD AUTO: 2.53 X10(3) UL (ref 1–4)
LYMPHOCYTES NFR BLD AUTO: 30.6 %
MCH RBC QN AUTO: 32.1 PG (ref 26–34)
MCHC RBC AUTO-ENTMCNC: 32.9 G/DL (ref 31–37)
MCV RBC AUTO: 97.6 FL
MICROALBUMIN UR-MCNC: 10.2 MG/DL
MICROALBUMIN/CREAT 24H UR-RTO: 43.2 UG/MG (ref ?–30)
MONOCYTES # BLD AUTO: 0.66 X10(3) UL (ref 0.1–1)
MONOCYTES NFR BLD AUTO: 8 %
NEUTROPHILS # BLD AUTO: 4.81 X10 (3) UL (ref 1.5–7.7)
NEUTROPHILS # BLD AUTO: 4.81 X10(3) UL (ref 1.5–7.7)
NEUTROPHILS NFR BLD AUTO: 58.2 %
NONHDLC SERPL-MCNC: 84 MG/DL (ref ?–130)
OSMOLALITY SERPL CALC.SUM OF ELEC: 295 MOSM/KG (ref 275–295)
PLATELET # BLD AUTO: 149 10(3)UL (ref 150–450)
POTASSIUM SERPL-SCNC: 4.4 MMOL/L (ref 3.5–5.1)
PROT SERPL-MCNC: 7.5 G/DL (ref 6.4–8.2)
RBC # BLD AUTO: 5.05 X10(6)UL
SODIUM SERPL-SCNC: 142 MMOL/L (ref 136–145)
TRIGL SERPL-MCNC: 159 MG/DL (ref 30–149)
TSI SER-ACNC: 3.42 MIU/ML (ref 0.36–3.74)
VIT B12 SERPL-MCNC: 658 PG/ML (ref 193–986)
VIT D+METAB SERPL-MCNC: 22.9 NG/ML (ref 30–100)
VLDLC SERPL CALC-MCNC: 23 MG/DL (ref 0–30)
WBC # BLD AUTO: 8.3 X10(3) UL (ref 4–11)

## 2022-02-26 PROCEDURE — 85025 COMPLETE CBC W/AUTO DIFF WBC: CPT

## 2022-02-26 PROCEDURE — 84443 ASSAY THYROID STIM HORMONE: CPT

## 2022-02-26 PROCEDURE — 82570 ASSAY OF URINE CREATININE: CPT

## 2022-02-26 PROCEDURE — 82306 VITAMIN D 25 HYDROXY: CPT

## 2022-02-26 PROCEDURE — 82043 UR ALBUMIN QUANTITATIVE: CPT

## 2022-02-26 PROCEDURE — 80053 COMPREHEN METABOLIC PANEL: CPT

## 2022-02-26 PROCEDURE — 80061 LIPID PANEL: CPT

## 2022-02-26 PROCEDURE — 36415 COLL VENOUS BLD VENIPUNCTURE: CPT

## 2022-02-26 PROCEDURE — 82607 VITAMIN B-12: CPT

## 2022-02-26 PROCEDURE — 83036 HEMOGLOBIN GLYCOSYLATED A1C: CPT

## 2022-03-02 NOTE — PROGRESS NOTES
JANICE Dorado - Overall labs are good. Still in pre-diabetes stage.  Vitamin D levels are low - please take extra vitamin D 2000 units daily. - Dr. Traci Laird

## 2022-03-03 RX ORDER — LISINOPRIL 10 MG/1
10 TABLET ORAL DAILY
Qty: 90 TABLET | Refills: 1 | Status: SHIPPED | OUTPATIENT
Start: 2022-03-03 | End: 2022-06-18

## 2022-03-03 NOTE — TELEPHONE ENCOUNTER
Refill passed per [facility] protocol.      Requested Prescriptions   Pending Prescriptions Disp Refills    LISINOPRIL 10 MG Oral Tab [Pharmacy Med Name: LISINOPRIL TABS 10MG] 90 tablet 3     Sig: TAKE 1 TABLET DAILY (NEED APPOINTMENT FOR FURTHER REFILLS)        Hypertensive Medications Protocol Passed - 3/3/2022 12:07 AM        Passed - CMP or BMP in past 12 months        Passed - Appointment in past 6 or next 3 months        Passed - GFR Non- > 50     Lab Results   Component Value Date    GFRNAA 69 02/26/2022                         Recent Outpatient Visits              1 year ago Hyperlipidemia, unspecified hyperlipidemia type    [address] Xi[address] Lorenzo MD    Office Visit    1 year ago Tobacco use    150 Xiomy Lorenzo MD    Office Visit    1 year ago Upper respiratory disease    [facility], [address], Braulio Kay PA-C    Telemedicine    2 years ago History of heart attack    [facility], Xiomy White MD    Office Visit    2 years ago Bilateral carotid artery stenosis    [facility], Panchito Sanderson MD    Office Visit             Future Appointments         Provider Department Appt Notes    In 2 days Rishabh Ballard MD [facility], [address], [place] physical

## 2022-03-14 RX ORDER — ROSUVASTATIN CALCIUM 40 MG/1
40 TABLET, COATED ORAL NIGHTLY
Qty: 90 TABLET | Refills: 0 | Status: SHIPPED | OUTPATIENT
Start: 2022-03-14 | End: 2022-06-13

## 2022-03-15 NOTE — TELEPHONE ENCOUNTER
Approved 90 day supply per triage protocol. Future Appointments   Date Time Provider Giovana Dodge   6/18/2022  9:30 AM Anup Uribe MD [facility]       Refill passed per [facility] protocol.      Requested Prescriptions   Pending Prescriptions Disp Refills    ROSUVASTATIN 40 MG Oral Tab [Pharmacy Med Name: ROSUVASTATIN TABS 40MG] 90 tablet 3     Sig: TAKE 1 TABLET NIGHTLY (STOP SIMVASTATIN)        Cholesterol Medication Protocol Failed - 3/14/2022 10:13 PM        Failed - Appointment in past 12 or next 3 months        Passed - ALT in past 12 months        Passed - LDL in past 12 months        Passed - Last ALT < 80       Lab Results   Component Value Date    ALT 30 02/26/2022             Passed - Last LDL < 130     Lab Results   Component Value Date    LDL 57 02/26/2022                      Future Appointments         Provider Department Appt Notes    In 3 months Anup Uribe MD [place] [facility] [employer], [address], [place] Annual physical             Recent Outpatient Visits              1 year ago Hyperlipidemia, unspecified hyperlipidemia type    [address] Ellie Lorenzo MD    Office Visit    1 year ago Tobacco use    [address] Ellie Lorenzo MD    Office Visit    1 year ago Upper respiratory disease    [employer], [address], Braulio Roper PA-C    Telemedicine    2 years ago History of heart attack    [address] Ellie Lorenzo MD    Office Visit    2 years ago Bilateral carotid artery stenosis    [facility], Alejandro Mon MD    Office Visit

## 2022-06-13 RX ORDER — ROSUVASTATIN CALCIUM 40 MG/1
40 TABLET, COATED ORAL NIGHTLY
Qty: 90 TABLET | Refills: 1 | Status: SHIPPED | OUTPATIENT
Start: 2022-06-13 | End: 2022-06-18

## 2022-06-13 RX ORDER — LEVOTHYROXINE SODIUM 0.1 MG/1
100 TABLET ORAL DAILY
Qty: 90 TABLET | Refills: 1 | Status: SHIPPED | OUTPATIENT
Start: 2022-06-13 | End: 2022-06-18

## 2022-06-13 NOTE — TELEPHONE ENCOUNTER
Refill passed per [employer] protocol.     Requested Prescriptions   Pending Prescriptions Disp Refills    LEVOTHYROXINE 100 MCG Oral Tab [Pharmacy Med Name: L-THYROXINE (SYNTHROID) TABS 100MCG] 90 tablet 3     Sig: TAKE 1 TABLET DAILY        Thyroid Medication Protocol Passed - 6/13/2022 12:03 AM        Passed - TSH in past 12 months        Passed - Last TSH value is normal     Lab Results   Component Value Date    TSH 3.420 02/26/2022                 Passed - Appointment in past 12 or next 3 months           ROSUVASTATIN 40 MG Oral Tab [Pharmacy Med Name: ROSUVASTATIN TABS 40MG] 90 tablet 3     Sig: TAKE 1 TABLET NIGHTLY (90 DAY SUPPLY UNTIL OFFICE VISIT)        Cholesterol Medication Protocol Passed - 6/13/2022 12:03 AM        Passed - ALT in past 12 months        Passed - LDL in past 12 months        Passed - Last ALT < 80       Lab Results   Component Value Date    ALT 30 02/26/2022             Passed - Last LDL < 130     Lab Results   Component Value Date    LDL 57 02/26/2022               Passed - Appointment in past 12 or next 3 months              Recent Outpatient Visits              1 year ago Hyperlipidemia, unspecified hyperlipidemia type    Julius Mills MD    Office Visit    1 year ago Tobacco use    Julius Mills MD    Office Visit    1 year ago Upper respiratory disease    [facility], [address], Braulio Lewis PA-C    Telemedicine    2 years ago History of heart attack    [facility], [address], Julius Smith MD    Office Visit    2 years ago Bilateral carotid artery stenosis    [facility], Shelby Melton MD    Office Visit            Future Appointments         Provider Department Appt Notes    In 5 days Fanta Novoa MD [employer], [address], [address] [facility] Annual physical

## 2022-06-18 ENCOUNTER — OFFICE VISIT (OUTPATIENT)
Dept: FAMILY MEDICINE CLINIC | Facility: CLINIC | Age: 62
End: 2022-06-18
Payer: COMMERCIAL

## 2022-06-18 VITALS
DIASTOLIC BLOOD PRESSURE: 76 MMHG | WEIGHT: 208.81 LBS | TEMPERATURE: 97 F | SYSTOLIC BLOOD PRESSURE: 133 MMHG | BODY MASS INDEX: 29.89 KG/M2 | HEART RATE: 60 BPM | HEIGHT: 70 IN

## 2022-06-18 DIAGNOSIS — Z00.00 ROUTINE MEDICAL EXAM: ICD-10-CM

## 2022-06-18 DIAGNOSIS — H26.9 CATARACT, UNSPECIFIED CATARACT TYPE, UNSPECIFIED LATERALITY: ICD-10-CM

## 2022-06-18 DIAGNOSIS — Z72.0 TOBACCO USE: Primary | ICD-10-CM

## 2022-06-18 DIAGNOSIS — E03.9 HYPOTHYROIDISM, UNSPECIFIED TYPE: ICD-10-CM

## 2022-06-18 DIAGNOSIS — I25.2 HISTORY OF HEART ATTACK: ICD-10-CM

## 2022-06-18 DIAGNOSIS — E11.9 DIET-CONTROLLED DIABETES MELLITUS (HCC): ICD-10-CM

## 2022-06-18 DIAGNOSIS — I10 ESSENTIAL HYPERTENSION: ICD-10-CM

## 2022-06-18 PROBLEM — R73.03 PREDIABETES: Status: ACTIVE | Noted: 2022-06-18

## 2022-06-18 LAB
CARTRIDGE LOT#: ABNORMAL NUMERIC
HEMOGLOBIN A1C: 6.2 % (ref 4.3–5.6)

## 2022-06-18 RX ORDER — LISINOPRIL 10 MG/1
10 TABLET ORAL DAILY
Qty: 90 TABLET | Refills: 4 | Status: SHIPPED | OUTPATIENT
Start: 2022-06-18

## 2022-06-18 RX ORDER — RIBOFLAVIN (VITAMIN B2) 100 MG
100 TABLET ORAL DAILY
COMMUNITY

## 2022-06-18 RX ORDER — ROSUVASTATIN CALCIUM 40 MG/1
40 TABLET, COATED ORAL NIGHTLY
Qty: 90 TABLET | Refills: 4 | Status: SHIPPED | OUTPATIENT
Start: 2022-06-18

## 2022-06-18 RX ORDER — MULTIVIT-MIN/IRON/FOLIC ACID/K 18-600-40
CAPSULE ORAL
COMMUNITY

## 2022-06-18 RX ORDER — ASPIRIN 81 MG/1
81 TABLET ORAL DAILY
Qty: 90 TABLET | Refills: 4 | Status: SHIPPED | OUTPATIENT
Start: 2022-06-18

## 2022-06-18 RX ORDER — LEVOTHYROXINE SODIUM 0.1 MG/1
100 TABLET ORAL DAILY
Qty: 90 TABLET | Refills: 4 | Status: SHIPPED | OUTPATIENT
Start: 2022-06-18

## 2022-10-06 ENCOUNTER — PATIENT MESSAGE (OUTPATIENT)
Dept: FAMILY MEDICINE CLINIC | Facility: CLINIC | Age: 62
End: 2022-10-06

## 2022-10-06 NOTE — TELEPHONE ENCOUNTER
From: Rox Diaz  To: Nehemias Menjivar MD  Sent: 10/6/2022 2:49 AM CDT  Subject: DTaP/Tdap/TD Vaccines    Hi [place] VERONIKA Vela messaged popped up in [address] stating I'm past due on my DTaP/Tdap/TD Vaccines. Before I schedule anything, considering my current pulmonary issues, I thought I'd check in with you to see if it's something I should take care of, or delay. FYI, I'll be scheduling my annual lung scan soon.     Thanks,  Darryl Zendejas

## 2022-10-06 NOTE — TELEPHONE ENCOUNTER
Please advise on question regarding pulmonary concerns and scheduling Tdap     (no records of previous Tdap vaccine found on [place] Immun[place])

## 2022-10-13 ENCOUNTER — NURSE ONLY (OUTPATIENT)
Dept: FAMILY MEDICINE CLINIC | Facility: CLINIC | Age: 62
End: 2022-10-13
Payer: COMMERCIAL

## 2022-10-13 DIAGNOSIS — Z23 NEED FOR VACCINATION: Primary | ICD-10-CM

## 2022-10-13 PROCEDURE — 90715 TDAP VACCINE 7 YRS/> IM: CPT | Performed by: FAMILY MEDICINE

## 2022-10-13 PROCEDURE — 90471 IMMUNIZATION ADMIN: CPT | Performed by: FAMILY MEDICINE

## 2022-10-13 NOTE — PROGRESS NOTES
Patient is here for TDAP he has verified name and , order in [facility] Injection given and tolerated well.

## 2022-12-17 ENCOUNTER — HOSPITAL ENCOUNTER (OUTPATIENT)
Dept: GENERAL RADIOLOGY | Age: 62
Discharge: HOME | End: 2022-12-17
Attending: FAMILY MEDICINE
Payer: COMMERCIAL

## 2022-12-17 ENCOUNTER — OFFICE VISIT (OUTPATIENT)
Dept: FAMILY MEDICINE CLINIC | Facility: CLINIC | Age: 62
End: 2022-12-17
Payer: COMMERCIAL

## 2022-12-17 ENCOUNTER — LAB ENCOUNTER (OUTPATIENT)
Dept: LAB | Age: 62
End: 2022-12-17
Attending: FAMILY MEDICINE
Payer: COMMERCIAL

## 2022-12-17 VITALS
BODY MASS INDEX: 31.18 KG/M2 | HEIGHT: 70 IN | TEMPERATURE: 98 F | HEART RATE: 71 BPM | SYSTOLIC BLOOD PRESSURE: 116 MMHG | DIASTOLIC BLOOD PRESSURE: 68 MMHG | WEIGHT: 217.81 LBS

## 2022-12-17 DIAGNOSIS — E55.9 VITAMIN D DEFICIENCY: ICD-10-CM

## 2022-12-17 DIAGNOSIS — E03.9 HYPOTHYROIDISM, UNSPECIFIED TYPE: ICD-10-CM

## 2022-12-17 DIAGNOSIS — M25.552 HIP PAIN, BILATERAL: ICD-10-CM

## 2022-12-17 DIAGNOSIS — E78.5 HYPERLIPIDEMIA, UNSPECIFIED HYPERLIPIDEMIA TYPE: ICD-10-CM

## 2022-12-17 DIAGNOSIS — E11.9 TYPE 2 DIABETES MELLITUS WITHOUT COMPLICATION, WITHOUT LONG-TERM CURRENT USE OF INSULIN (HCC): Primary | ICD-10-CM

## 2022-12-17 DIAGNOSIS — G89.29 CHRONIC PAIN OF RIGHT ANKLE: ICD-10-CM

## 2022-12-17 DIAGNOSIS — M25.551 HIP PAIN, BILATERAL: ICD-10-CM

## 2022-12-17 DIAGNOSIS — M25.571 CHRONIC PAIN OF RIGHT ANKLE: ICD-10-CM

## 2022-12-17 DIAGNOSIS — H93.13 TINNITUS OF BOTH EARS: ICD-10-CM

## 2022-12-17 DIAGNOSIS — E11.9 TYPE 2 DIABETES MELLITUS WITHOUT COMPLICATION, WITHOUT LONG-TERM CURRENT USE OF INSULIN (HCC): ICD-10-CM

## 2022-12-17 PROBLEM — E11.40 TYPE 2 DIABETES MELLITUS WITH DIABETIC NEUROPATHY (HCC): Status: ACTIVE | Noted: 2022-12-17

## 2022-12-17 LAB
ALBUMIN SERPL-MCNC: 4.1 G/DL (ref 3.4–5)
ALBUMIN/GLOB SERPL: 1.1 {RATIO} (ref 1–2)
ALP LIVER SERPL-CCNC: 69 U/L
ALT SERPL-CCNC: 41 U/L
ANION GAP SERPL CALC-SCNC: 5 MMOL/L (ref 0–18)
AST SERPL-CCNC: 27 U/L (ref 15–37)
BASOPHILS # BLD AUTO: 0.08 X10(3) UL (ref 0–0.2)
BASOPHILS NFR BLD AUTO: 0.9 %
BILIRUB SERPL-MCNC: 0.7 MG/DL (ref 0.1–2)
BUN BLD-MCNC: 19 MG/DL (ref 7–18)
BUN/CREAT SERPL: 15.7 (ref 10–20)
CALCIUM BLD-MCNC: 9.6 MG/DL (ref 8.5–10.1)
CHLORIDE SERPL-SCNC: 106 MMOL/L (ref 98–112)
CHOLEST SERPL-MCNC: 132 MG/DL (ref ?–200)
CO2 SERPL-SCNC: 31 MMOL/L (ref 21–32)
COMPLEXED PSA SERPL-MCNC: 1.28 NG/ML (ref ?–4)
CREAT BLD-MCNC: 1.21 MG/DL
DEPRECATED RDW RBC AUTO: 47.6 FL (ref 35.1–46.3)
EOSINOPHIL # BLD AUTO: 0.17 X10(3) UL (ref 0–0.7)
EOSINOPHIL NFR BLD AUTO: 2 %
ERYTHROCYTE [DISTWIDTH] IN BLOOD BY AUTOMATED COUNT: 13.2 % (ref 11–15)
EST. AVERAGE GLUCOSE BLD GHB EST-MCNC: 146 MG/DL (ref 68–126)
FASTING PATIENT LIPID ANSWER: YES
FASTING STATUS PATIENT QL REPORTED: YES
GFR SERPLBLD BASED ON 1.73 SQ M-ARVRAT: 67 ML/MIN/1.73M2 (ref 60–?)
GLOBULIN PLAS-MCNC: 3.8 G/DL (ref 2.8–4.4)
GLUCOSE BLD-MCNC: 117 MG/DL (ref 70–99)
HBA1C MFR BLD: 6.7 % (ref ?–5.7)
HCT VFR BLD AUTO: 51.4 %
HDLC SERPL-MCNC: 50 MG/DL (ref 40–59)
HGB BLD-MCNC: 16.7 G/DL
IMM GRANULOCYTES # BLD AUTO: 0.02 X10(3) UL (ref 0–1)
IMM GRANULOCYTES NFR BLD: 0.2 %
LDLC SERPL CALC-MCNC: 50 MG/DL (ref ?–100)
LYMPHOCYTES # BLD AUTO: 2.33 X10(3) UL (ref 1–4)
LYMPHOCYTES NFR BLD AUTO: 27.4 %
MCH RBC QN AUTO: 31.6 PG (ref 26–34)
MCHC RBC AUTO-ENTMCNC: 32.5 G/DL (ref 31–37)
MCV RBC AUTO: 97.3 FL
MONOCYTES # BLD AUTO: 0.65 X10(3) UL (ref 0.1–1)
MONOCYTES NFR BLD AUTO: 7.6 %
NEUTROPHILS # BLD AUTO: 5.26 X10 (3) UL (ref 1.5–7.7)
NEUTROPHILS # BLD AUTO: 5.26 X10(3) UL (ref 1.5–7.7)
NEUTROPHILS NFR BLD AUTO: 61.9 %
NONHDLC SERPL-MCNC: 82 MG/DL (ref ?–130)
OSMOLALITY SERPL CALC.SUM OF ELEC: 297 MOSM/KG (ref 275–295)
PLATELET # BLD AUTO: 174 10(3)UL (ref 150–450)
POTASSIUM SERPL-SCNC: 5.2 MMOL/L (ref 3.5–5.1)
PROT SERPL-MCNC: 7.9 G/DL (ref 6.4–8.2)
RBC # BLD AUTO: 5.28 X10(6)UL
SODIUM SERPL-SCNC: 142 MMOL/L (ref 136–145)
TRIGL SERPL-MCNC: 197 MG/DL (ref 30–149)
TSI SER-ACNC: 1.83 MIU/ML (ref 0.36–3.74)
VIT D+METAB SERPL-MCNC: 40.7 NG/ML (ref 30–100)
VLDLC SERPL CALC-MCNC: 28 MG/DL (ref 0–30)
WBC # BLD AUTO: 8.5 X10(3) UL (ref 4–11)

## 2022-12-17 PROCEDURE — 3044F HG A1C LEVEL LT 7.0%: CPT | Performed by: FAMILY MEDICINE

## 2022-12-17 PROCEDURE — 82306 VITAMIN D 25 HYDROXY: CPT

## 2022-12-17 PROCEDURE — 3074F SYST BP LT 130 MM HG: CPT | Performed by: FAMILY MEDICINE

## 2022-12-17 PROCEDURE — 3008F BODY MASS INDEX DOCD: CPT | Performed by: FAMILY MEDICINE

## 2022-12-17 PROCEDURE — 99215 OFFICE O/P EST HI 40 MIN: CPT | Performed by: FAMILY MEDICINE

## 2022-12-17 PROCEDURE — 83036 HEMOGLOBIN GLYCOSYLATED A1C: CPT

## 2022-12-17 PROCEDURE — 80053 COMPREHEN METABOLIC PANEL: CPT

## 2022-12-17 PROCEDURE — 84443 ASSAY THYROID STIM HORMONE: CPT

## 2022-12-17 PROCEDURE — 3078F DIAST BP <80 MM HG: CPT | Performed by: FAMILY MEDICINE

## 2022-12-17 PROCEDURE — 36415 COLL VENOUS BLD VENIPUNCTURE: CPT

## 2022-12-17 PROCEDURE — 80061 LIPID PANEL: CPT

## 2022-12-17 PROCEDURE — 73502 X-RAY EXAM HIP UNI 2-3 VIEWS: CPT | Performed by: FAMILY MEDICINE

## 2022-12-17 PROCEDURE — 85025 COMPLETE CBC W/AUTO DIFF WBC: CPT

## 2022-12-19 ENCOUNTER — LAB ENCOUNTER (OUTPATIENT)
Dept: LAB | Age: 62
End: 2022-12-19
Attending: FAMILY MEDICINE
Payer: COMMERCIAL

## 2022-12-19 DIAGNOSIS — E11.9 TYPE 2 DIABETES MELLITUS WITHOUT COMPLICATION, WITHOUT LONG-TERM CURRENT USE OF INSULIN (HCC): ICD-10-CM

## 2022-12-19 LAB
CREAT UR-SCNC: 210 MG/DL
MICROALBUMIN UR-MCNC: 1.04 MG/DL
MICROALBUMIN/CREAT 24H UR-RTO: 5 UG/MG (ref ?–30)

## 2022-12-19 PROCEDURE — 82570 ASSAY OF URINE CREATININE: CPT

## 2022-12-19 PROCEDURE — 82043 UR ALBUMIN QUANTITATIVE: CPT

## 2022-12-20 RX ORDER — MELOXICAM 15 MG/1
15 TABLET ORAL DAILY
Qty: 30 TABLET | Refills: 0 | Status: SHIPPED | OUTPATIENT
Start: 2022-12-20

## 2022-12-20 NOTE — PROGRESS NOTES
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 I will send in meloxicam 15 mg to take for one week with food to decrease the inflammation.  I do not want you to take it daily long term but you can take it for a week every few weeks when pain is flared up. - Dr. Anjelica Wright

## 2022-12-21 NOTE — PROGRESS NOTES
Son Carlos - You are back in range to need medications for your diabetes. You can change your diet and start medications or change diet first and repeat labs in 3 months. Rest of the labs are stable overall.  Potassium levels were just over normal limit but not concerning. - Dr. Monique Birmingham

## 2022-12-22 RX ORDER — METFORMIN HYDROCHLORIDE 500 MG/1
500 TABLET, EXTENDED RELEASE ORAL
Qty: 90 TABLET | Refills: 4 | Status: SHIPPED | OUTPATIENT
Start: 2022-12-22 | End: 2022-12-22

## 2022-12-22 RX ORDER — METFORMIN HYDROCHLORIDE 500 MG/1
500 TABLET, EXTENDED RELEASE ORAL
Qty: 90 TABLET | Refills: 4 | Status: SHIPPED | OUTPATIENT
Start: 2022-12-22

## 2023-01-27 ENCOUNTER — OFFICE VISIT (OUTPATIENT)
Dept: OTOLARYNGOLOGY | Facility: CLINIC | Age: 63
End: 2023-01-27

## 2023-01-27 ENCOUNTER — OFFICE VISIT (OUTPATIENT)
Dept: AUDIOLOGY | Facility: CLINIC | Age: 63
End: 2023-01-27

## 2023-01-27 DIAGNOSIS — H93.13 TINNITUS AURIUM, BILATERAL: Primary | ICD-10-CM

## 2023-01-27 DIAGNOSIS — H93.13 TINNITUS OF BOTH EARS: Primary | ICD-10-CM

## 2023-01-27 DIAGNOSIS — H93.13 TINNITUS, BILATERAL: ICD-10-CM

## 2023-01-27 PROCEDURE — 92557 COMPREHENSIVE HEARING TEST: CPT | Performed by: AUDIOLOGIST

## 2023-01-27 PROCEDURE — 92567 TYMPANOMETRY: CPT | Performed by: AUDIOLOGIST

## 2023-01-27 PROCEDURE — 99203 OFFICE O/P NEW LOW 30 MIN: CPT | Performed by: OTOLARYNGOLOGY

## 2023-03-17 ENCOUNTER — HOSPITAL ENCOUNTER (OUTPATIENT)
Dept: ULTRASOUND IMAGING | Age: 63
Discharge: HOME | End: 2023-03-17
Attending: PHYSICIAN ASSISTANT
Payer: COMMERCIAL

## 2023-03-17 DIAGNOSIS — I65.23 BILATERAL CAROTID ARTERY STENOSIS: ICD-10-CM

## 2023-03-17 PROCEDURE — 93880 EXTRACRANIAL BILAT STUDY: CPT | Performed by: PHYSICIAN ASSISTANT

## 2023-04-14 ENCOUNTER — HOSPITAL ENCOUNTER (OUTPATIENT)
Dept: CT IMAGING | Facility: HOSPITAL | Age: 63
Discharge: HOME | End: 2023-04-14
Attending: FAMILY MEDICINE
Payer: COMMERCIAL

## 2023-04-14 DIAGNOSIS — Z72.0 TOBACCO USE: ICD-10-CM

## 2023-04-14 PROCEDURE — 71271 CT THORAX LUNG CANCER SCR C-: CPT | Performed by: FAMILY MEDICINE

## 2023-04-28 NOTE — H&P
[address] [facility] [address] Gastroenterology                                                                                                  Clinic History and Physical     Pa Exposure to hepatitis 1971    Hep A   • Fetal and  jaundice When I had HEP   • Hemorrhoids 2018    1   • Lupus     Not me but my sister has Lupus   • Other and unspecified hyperlipidemia       Past Surgical History:  No date: ANESTH,SURGERY OF Yes seasonal allergies   ENDOCRINE:  negative for cold intolerance and heat intolerance  MUSCULOSKELETAL:  negative for joint effusion/severe erythema +shoulder pain  BEHAVIOR/PSYCH:  negative for psychotic behavior    PHYSICAL EXAM:   Blood pressure 130/80, p cessation    Colonoscopy consent: I have discussed the risks, benefits, and alternatives to colonoscopy with the patient [who demonstrated understanding], including but not limited to the risks of bleeding, infection, pain, death, as well as the risks of a

## 2023-06-04 ENCOUNTER — PATIENT MESSAGE (OUTPATIENT)
Dept: FAMILY MEDICINE CLINIC | Facility: CLINIC | Age: 63
End: 2023-06-04

## 2023-06-05 ENCOUNTER — NURSE TRIAGE (OUTPATIENT)
Dept: FAMILY MEDICINE CLINIC | Facility: CLINIC | Age: 63
End: 2023-06-05

## 2023-06-05 RX ORDER — ALBUTEROL SULFATE 90 UG/1
2 AEROSOL, METERED RESPIRATORY (INHALATION)
Qty: 48 G | Refills: 3 | Status: SHIPPED | OUTPATIENT
Start: 2023-06-05

## 2023-06-05 NOTE — TELEPHONE ENCOUNTER
From: Cecily Fields  To: Gabriel Baird MD  Sent: 6/4/2023 9:06 AM CDT  Subject: Spirometry test    Hi Doc,    Coming in this month, as you told me to, but you're getting so popular you didn't have anything open until August. So, they booked me with Shailesh Norton. Anyway, I wanted to update you on something. I've been monitoring my oxygen levels, which were okay, but they're down a little. Averaging 93 most days. The other thing is it's getting a little bit more difficult to breath. Not like crazy difficult, but it doesn't take much more than climbing a few stairs to get me to stop and catch my breath. I know I was diagnosed with mild emphysema a few years back and chronic bronchitis, but other than the annual scans, we've never discussed what stage I am.    I was just reading an article about a spirometry test. Is that something I should have? Thought I'd ask be fore I come in. My appointment is scheduled for June 24th. Let me know what you think. Maybe you can fill Amber Rotunda in before I get there. It's kind of weird seeing someone else after years of only seeing you. Hope you're planning on playing, softball was it, again this year.  You looked great last year when I saw you

## 2023-06-24 ENCOUNTER — OFFICE VISIT (OUTPATIENT)
Dept: FAMILY MEDICINE CLINIC | Facility: CLINIC | Age: 63
End: 2023-06-24

## 2023-06-24 VITALS
SYSTOLIC BLOOD PRESSURE: 125 MMHG | RESPIRATION RATE: 17 BRPM | DIASTOLIC BLOOD PRESSURE: 74 MMHG | WEIGHT: 219 LBS | HEART RATE: 69 BPM | HEIGHT: 70 IN | BODY MASS INDEX: 31.35 KG/M2

## 2023-06-24 DIAGNOSIS — E11.40 TYPE 2 DIABETES MELLITUS WITH DIABETIC NEUROPATHY, WITHOUT LONG-TERM CURRENT USE OF INSULIN (HCC): ICD-10-CM

## 2023-06-24 DIAGNOSIS — E03.9 HYPOTHYROIDISM, UNSPECIFIED TYPE: ICD-10-CM

## 2023-06-24 DIAGNOSIS — E78.00 PURE HYPERCHOLESTEROLEMIA: ICD-10-CM

## 2023-06-24 DIAGNOSIS — Z12.11 SCREENING FOR MALIGNANT NEOPLASM OF COLON: ICD-10-CM

## 2023-06-24 DIAGNOSIS — Z00.00 WELL ADULT EXAM: Primary | ICD-10-CM

## 2023-06-25 ENCOUNTER — PATIENT MESSAGE (OUTPATIENT)
Dept: FAMILY MEDICINE CLINIC | Facility: CLINIC | Age: 63
End: 2023-06-25

## 2023-06-25 NOTE — TELEPHONE ENCOUNTER
From: Anu Lobato  To: Jing Stone MD  Sent: 6/25/2023 10:00 AM CDT  Subject: Edits to Physical summary of 6/24/23    My message was longer than the character limitations for what messaging here are. Please see the one page message that is attached about some corrections that are needed for my after visit summary of 6/24/23.     I would appreciate a response to my question about an initial Medicare exam.    Please see attached

## 2023-08-04 ENCOUNTER — LAB ENCOUNTER (OUTPATIENT)
Dept: LAB | Age: 63
End: 2023-08-04
Attending: NURSE PRACTITIONER
Payer: MEDICARE

## 2023-08-04 DIAGNOSIS — E11.40 TYPE 2 DIABETES MELLITUS WITH DIABETIC NEUROPATHY, WITHOUT LONG-TERM CURRENT USE OF INSULIN (HCC): ICD-10-CM

## 2023-08-04 DIAGNOSIS — E03.9 HYPOTHYROIDISM, UNSPECIFIED TYPE: ICD-10-CM

## 2023-08-04 DIAGNOSIS — E78.00 PURE HYPERCHOLESTEROLEMIA: ICD-10-CM

## 2023-08-04 DIAGNOSIS — Z00.00 WELL ADULT EXAM: ICD-10-CM

## 2023-08-04 LAB
ALBUMIN SERPL-MCNC: 4.2 G/DL (ref 3.4–5)
ALBUMIN/GLOB SERPL: 1.1 {RATIO} (ref 1–2)
ALP LIVER SERPL-CCNC: 62 U/L
ALT SERPL-CCNC: 34 U/L
ANION GAP SERPL CALC-SCNC: 7 MMOL/L (ref 0–18)
AST SERPL-CCNC: 29 U/L (ref 15–37)
BASOPHILS # BLD AUTO: 0.07 X10(3) UL (ref 0–0.2)
BASOPHILS NFR BLD AUTO: 1 %
BILIRUB SERPL-MCNC: 0.7 MG/DL (ref 0.1–2)
BUN BLD-MCNC: 14 MG/DL (ref 7–18)
CALCIUM BLD-MCNC: 9.8 MG/DL (ref 8.5–10.1)
CHLORIDE SERPL-SCNC: 109 MMOL/L (ref 98–112)
CHOLEST SERPL-MCNC: 98 MG/DL (ref ?–200)
CO2 SERPL-SCNC: 24 MMOL/L (ref 21–32)
CREAT BLD-MCNC: 1.15 MG/DL
CREAT UR-SCNC: 343 MG/DL
EGFRCR SERPLBLD CKD-EPI 2021: 71 ML/MIN/1.73M2 (ref 60–?)
EOSINOPHIL # BLD AUTO: 0.07 X10(3) UL (ref 0–0.7)
EOSINOPHIL NFR BLD AUTO: 1 %
ERYTHROCYTE [DISTWIDTH] IN BLOOD BY AUTOMATED COUNT: 13 %
EST. AVERAGE GLUCOSE BLD GHB EST-MCNC: 151 MG/DL (ref 68–126)
FASTING PATIENT LIPID ANSWER: YES
FASTING STATUS PATIENT QL REPORTED: YES
GLOBULIN PLAS-MCNC: 3.9 G/DL (ref 2.8–4.4)
GLUCOSE BLD-MCNC: 93 MG/DL (ref 70–99)
HBA1C MFR BLD: 6.9 % (ref ?–5.7)
HCT VFR BLD AUTO: 47.3 %
HDLC SERPL-MCNC: 46 MG/DL (ref 40–59)
HGB BLD-MCNC: 15.8 G/DL
IMM GRANULOCYTES # BLD AUTO: 0.03 X10(3) UL (ref 0–1)
IMM GRANULOCYTES NFR BLD: 0.4 %
LDLC SERPL CALC-MCNC: 31 MG/DL (ref ?–100)
LYMPHOCYTES # BLD AUTO: 2 X10(3) UL (ref 1–4)
LYMPHOCYTES NFR BLD AUTO: 27.8 %
MCH RBC QN AUTO: 31.3 PG (ref 26–34)
MCHC RBC AUTO-ENTMCNC: 33.4 G/DL (ref 31–37)
MCV RBC AUTO: 93.7 FL
MICROALBUMIN UR-MCNC: 5.08 MG/DL
MICROALBUMIN/CREAT 24H UR-RTO: 14.8 UG/MG (ref ?–30)
MONOCYTES # BLD AUTO: 0.52 X10(3) UL (ref 0.1–1)
MONOCYTES NFR BLD AUTO: 7.2 %
NEUTROPHILS # BLD AUTO: 4.5 X10 (3) UL (ref 1.5–7.7)
NEUTROPHILS # BLD AUTO: 4.5 X10(3) UL (ref 1.5–7.7)
NEUTROPHILS NFR BLD AUTO: 62.6 %
NONHDLC SERPL-MCNC: 52 MG/DL (ref ?–130)
OSMOLALITY SERPL CALC.SUM OF ELEC: 290 MOSM/KG (ref 275–295)
PLATELET # BLD AUTO: 190 10(3)UL (ref 150–450)
POTASSIUM SERPL-SCNC: 4.2 MMOL/L (ref 3.5–5.1)
PROT SERPL-MCNC: 8.1 G/DL (ref 6.4–8.2)
RBC # BLD AUTO: 5.05 X10(6)UL
SODIUM SERPL-SCNC: 140 MMOL/L (ref 136–145)
TRIGL SERPL-MCNC: 120 MG/DL (ref 30–149)
TSI SER-ACNC: 1.53 MIU/ML (ref 0.36–3.74)
VLDLC SERPL CALC-MCNC: 16 MG/DL (ref 0–30)
WBC # BLD AUTO: 7.2 X10(3) UL (ref 4–11)

## 2023-08-04 PROCEDURE — 83036 HEMOGLOBIN GLYCOSYLATED A1C: CPT

## 2023-08-04 PROCEDURE — 80053 COMPREHEN METABOLIC PANEL: CPT

## 2023-08-04 PROCEDURE — 80061 LIPID PANEL: CPT

## 2023-08-04 PROCEDURE — 85025 COMPLETE CBC W/AUTO DIFF WBC: CPT

## 2023-08-04 PROCEDURE — 82570 ASSAY OF URINE CREATININE: CPT

## 2023-08-04 PROCEDURE — 82043 UR ALBUMIN QUANTITATIVE: CPT

## 2023-08-04 PROCEDURE — 84443 ASSAY THYROID STIM HORMONE: CPT

## 2023-08-04 PROCEDURE — 36415 COLL VENOUS BLD VENIPUNCTURE: CPT

## 2023-08-08 ENCOUNTER — TELEPHONE (OUTPATIENT)
Dept: FAMILY MEDICINE CLINIC | Facility: CLINIC | Age: 63
End: 2023-08-08

## 2023-08-21 ENCOUNTER — TELEPHONE (OUTPATIENT)
Facility: CLINIC | Age: 63
End: 2023-08-21

## 2023-08-21 NOTE — TELEPHONE ENCOUNTER
[facility]         Donny Hodges          MR #: [id number]     : 1960     PHYSICIAN: Mendoza Chowdhury MD     [facility]                        OPERATIVE NOTE          DATE OF PROCEDURE:   2018                                                         PREOPERATIVE DIAGNOSIS: Colorectal cancer screening. POSTOPERATIVE DIAGNOSES: Colon and rectal polyps, hemorrhoids. PROCEDURES: Colonoscopy with biopsy and snare polypectomy. SURGEON: Mendoza Chowdhury MD.   SEDATION:  Monitored anesthesia care. CONSENT:  We discussed the risks, benefits, and alternatives to this procedure as well as the planned sedation. Informed consent was obtained from the patient after the risks of the procedure were discussed including, but not limited to bleeding, perforation, aspiration, infection, or possibility of a missed lesion as well as the risks of anesthesia including, but not limited to cardiopulmonary complications. The patient signed the informed consent and elected to proceed with the procedure with intervention as indicated (i.e., biopsy, control of bleeding, dilatation, and polypectomy). COLONOSCOPY PROCEDURE:  A digital rectal exam was performed, which was normal.  Once an adequate level of sedation was obtained, the lubricated tip of the Olympus 190 diagnostic video colonoscope was carefully inserted and advanced without difficulty to the cecum using the air insufflation technique. The cecum was identified by localizing the trifold, the appendix, and the ileocecal valve. Withdrawal was begun with thorough washing and careful examination of the colonic walls and folds. Retroflexion was performed in the ascending colon with views of the ascending colon in both the forward and retroflexed views. Photo documentation was obtained. The bowel prep was good. Views of the colon were good with washing. Withdrawal time was 26 minutes.   Air was then withdrawn and the endoscope was removed. The patient tolerated the procedure well. There were no immediate postoperative complications. The patient's vital signs were monitored throughout the procedure and remained stable. SPECIMENS REMOVED:  Colon and rectal polyps. ESTIMATED BLOOD LOSS:  Insignificant. COMPLICATIONS:  None. FINDINGS:  Terminal ileum:  Normal mucosa. Cecum:  Normal mucosa and vascular pattern. Ascending colon:  There was a 5 mm sessile mid ascending colon polyp removed by cold snare polypectomy; otherwise, normal mucosa and vascular pattern. Transverse colon:  Normal mucosa and vascular pattern. Descending colon:  Normal mucosa and vascular pattern. Sigmoid colon:   There was a 3 mm distal sigmoid colon polyp removed by cold biopsy forceps; otherwise, normal mucosa and vascular pattern. Rectum:  Retroflexed view showed small nonbleeding hemorrhoids. There was also a 3 mm distal rectal polyp removed by cold biopsy forceps; otherwise, normal mucosa and vascular pattern. IMPRESSION:    1. Three small (5 mm or less) colon and rectal polyps removed. 2.  Small nonbleeding hemorrhoids. 3.  Otherwise normal terminal ileum, colon, and rectum. RECOMMENDATIONS:    1. Await pathology results. 2.  Colonoscopy surveillance in 3years if the 3polyps are adenomas or in 5years if 1-2 polyps are adenomas. 3.  Continue your current medications. 4.  Follow up with your primary care physician.                Electronically Approved by:    Knena Gutierrez MD          MG          TID: [id number]     DD:  09/19/2018     DT:   09/19/2018   ==========================================================  Component 9/19/18 10:15 AM   Case Report Surgical Pathology                                Case: [id number]                                  Authorizing Provider:  Shubham Knutson MD     Collected:           09/19/2018 10:15 AM          Pathologist:           Darnell Workman Luz Maria Esparza MD      Received:            09/19/2018 02:41 PM          Specimens:   A) - Colon ascending, ascending colon polyp                                                         B) - Sigmoid colon, sigmoid colon polyp                                                             C) - Rectum, rectal polyp                                                               Final Diagnosis:       A. Ascending colon polyp:  Tubular adenoma. B. Sigmoid colon polyp:  Hyperplastic polyp. C. Rectal polyp:   Hyperplastic polyp.       Electronically signed by Paola Heaton MD on 9/20/2018 at  9:18 AM

## 2023-08-21 NOTE — TELEPHONE ENCOUNTER
Last Procedure, Date, MD:  Shoshana Paulson, 09/19/2018, Colonoscopy  Last Diagnosis:  tubular adenoma  Recalled (mth/yrs): 5 years  Sedation Used Previously:  MAC  Last Prep Used (if known):  Trilyte  Quality Of Prep (if known): good  Anticoagulants:  Diuretics:   Diabetic Med's (PO/Injectables): metformin  Weight loss Med's:  Iron/Herbal/Multivitamin Supplements (RX/OTC):  Marijuana/Vaping/CBD:  Height & Weight: 5'10\"/219  BMI: 31.42  Hx of Cardiac/CVA Issues/(MI/Stroke): CAD  Devices Pacemaker/Defibrillator/Stents: no  Respiratory Issues/Oxygen Use/BRODY/COPD:  Issues w/ Anesthesia:    Symptoms (Y/N):   Symptoms Details:     Special Comments/Notes:    Please advise on orders and prep. Thank you!

## 2023-08-21 NOTE — TELEPHONE ENCOUNTER
----- Message from Gene Askew CMA sent at 2018  9:38 AM CDT -----  Regardin yr CLN recall  Recall colon in 5 years per.  Colon done 18

## 2023-09-01 ENCOUNTER — TELEPHONE (OUTPATIENT)
Facility: CLINIC | Age: 63
End: 2023-09-01

## 2023-09-01 DIAGNOSIS — Z86.010 HISTORY OF ADENOMATOUS POLYP OF COLON: ICD-10-CM

## 2023-09-01 RX ORDER — SODIUM, POTASSIUM,MAG SULFATES 17.5-3.13G
SOLUTION, RECONSTITUTED, ORAL ORAL
Qty: 1 EACH | Refills: 0 | Status: SHIPPED | OUTPATIENT
Start: 2023-09-01

## 2023-09-05 ENCOUNTER — PATIENT MESSAGE (OUTPATIENT)
Dept: FAMILY MEDICINE CLINIC | Facility: CLINIC | Age: 63
End: 2023-09-05

## 2023-09-07 RX ORDER — LISINOPRIL 10 MG/1
10 TABLET ORAL DAILY
Qty: 90 TABLET | Refills: 3 | Status: SHIPPED | OUTPATIENT
Start: 2023-09-07

## 2023-09-07 RX ORDER — METFORMIN HYDROCHLORIDE 500 MG/1
500 TABLET, EXTENDED RELEASE ORAL
Qty: 90 TABLET | Refills: 3 | Status: SHIPPED | OUTPATIENT
Start: 2023-09-07

## 2023-09-07 RX ORDER — ROSUVASTATIN CALCIUM 40 MG/1
40 TABLET, COATED ORAL NIGHTLY
Qty: 90 TABLET | Refills: 3 | Status: SHIPPED | OUTPATIENT
Start: 2023-09-07

## 2023-09-07 RX ORDER — LEVOTHYROXINE SODIUM 0.1 MG/1
100 TABLET ORAL DAILY
Qty: 90 TABLET | Refills: 3 | Status: SHIPPED | OUTPATIENT
Start: 2023-09-07

## 2023-09-07 RX ORDER — ASPIRIN 81 MG/1
81 TABLET ORAL DAILY
Qty: 90 TABLET | Refills: 3 | Status: SHIPPED | OUTPATIENT
Start: 2023-09-07

## 2023-09-07 RX ORDER — ALBUTEROL SULFATE 90 UG/1
2 AEROSOL, METERED RESPIRATORY (INHALATION)
Qty: 48 G | Refills: 3 | Status: SHIPPED | OUTPATIENT
Start: 2023-09-07

## 2023-09-07 NOTE — TELEPHONE ENCOUNTER
Refill passed per [place] [employer] protocol. Requested Prescriptions   Pending Prescriptions Disp Refills    albuterol 108 (90 Base) MCG/ACT Inhalation Aero Soln 48 g 3     Sig: Inhale 2 puffs into the lungs every 4 to 6 hours as needed for Wheezing or Shortness of Breath. Asthma & COPD Medication Protocol Passed - 9/6/2023  9:21 PM        Passed - In person appointment or virtual visit in the past 6 mos or appointment in next 3 mos     Recent Outpatient Visits              2 months ago Well adult exam    [address] [facility], [address], APRN    Office Visit    7 months ago Tinnitus aurium, bilateral    [facility], [address], [place], [place], [address], [employer] Visit    7 months ago Tinnitus of both ears    [address], [address], [place], Mars Bello MD    Office Visit    8 months ago Type 2 diabetes mellitus without complication, without long-term current use of insulin [facility])    [address], [address], Xiomy Graf MD    Office Visit    10 months ago Need for vaccination    [facility], [place]    Nurse Only          Future Appointments         Provider Department Appt Notes    In 2 months [facility] [address], [place] CLN w.  MAC @ [facility]    In 4 months Rishabh Ballard MD [facility], [place] Annual physical                 metFORMIN  MG Oral Tablet 24 Hr 90 tablet 3     Sig: Take 1 tablet (500 mg total) by mouth daily with breakfast.       Diabetes Medication Protocol Passed - 9/6/2023  9:21 PM        Passed - Last A1C < 7.5 and within past 6 months     Lab Results   Component Value Date    A1C 6.9 (H) 08/04/2023             Passed - In person appointment or virtual visit in the past 6 mos or appointment in next 3 mos     Recent Outpatient Visits              2 months ago Well adult exam    Blaine Strange, [address], APRN    Office Visit    7 months ago Tinnitus aurium, bilateral    [facility], [address], [place], [place], [address], [employer] Visit    7 months ago Tinnitus of both ears    [address], [address], Patricia Copeland MD    Office Visit    8 months ago Type 2 diabetes mellitus without complication, without long-term current use of insulin [facility])    Blaine Strange, Ellie Lopez MD    Office Visit    10 months ago Need for vaccination    Blaine Strange, Braulio    Nurse Only          Future Appointments         Provider Department Appt Notes    In 2 months [facility], [address], [place] CLN w. MAC @ [facility]    In 4 months MD Blaine Velez [place] Annual physical               Passed - [facility] or [facility] > 50     GFR Evaluation  EGFRCR: 71 , resulted on 8/4/2023          Passed - GFR in the past 12 months          lisinopril 10 MG Oral Tab 90 tablet 3     Sig: Take 1 tablet (10 mg total) by mouth daily.        Hypertensive Medications Protocol Passed - 9/6/2023  9:21 PM        Passed - In person appointment in the past 12 or next 3 months     Recent Outpatient Visits              2 months ago Well adult exam    Blaine Strange, [address], APRN    Office Visit    7 months ago Tinnitus aurium, bilateral    [facility], [address], [place], [place], [place], [employer] Visit    7 months ago Tinnitus of both Maisha Dutch, [address], Patricia Copeland MD    Office Visit    8 months ago Type 2 diabetes mellitus without complication, without long-term current use of insulin [facility])    Blaine Strange, Jacky Rivero, Sammy Coats MD Office Visit    10 months ago Need for vaccination    [address] [place] [address], [place]    Nurse Only          Future Appointments         Provider Department Appt Notes    In 2 months [employer], PROCEDURE [facility], [address], [place] CLN w. MAC @ [facility]    In 4 months Lisa Lucio MD [facility], [place] Annual physical               Passed - Last BP reading less than 140/90     BP Readings from Last 1 Encounters:  06/24/23 : 125/74              Passed - CMP or BMP in past 6 months     Recent Results (from the past 4392 hour(s))   COMP METABOLIC PANEL (14)    Collection Time: 08/04/23 12:03 PM   Result Value Ref Range    Glucose 93 70 - 99 mg/dL    Sodium 140 136 - 145 mmol/L    Potassium 4.2 3.5 - 5.1 mmol/L    Chloride 109 98 - 112 mmol/L    CO2 24.0 21.0 - 32.0 mmol/L    Anion Gap 7 0 - 18 mmol/L    BUN 14 7 - 18 mg/dL    Creatinine 1.15 0.70 - 1.30 mg/dL    Calcium, Total 9.8 8.5 - 10.1 mg/dL    Calculated Osmolality 290 275 - 295 mOsm/kg    eGFR-Cr 71 >=60 mL/min/1.73m2    AST 29 15 - 37 U/L    ALT 34 16 - 61 U/L    Alkaline Phosphatase 62 45 - 117 U/L    Bilirubin, Total 0.7 0.1 - 2.0 mg/dL    Total Protein 8.1 6.4 - 8.2 g/dL    Albumin 4.2 3.4 - 5.0 g/dL    Globulin  3.9 2.8 - 4.4 g/dL    A/G Ratio 1.1 1.0 - 2.0    Patient Fasting for CMP? Yes      *Note: Due to a large number of results and/or encounters for the requested time period, some results have not been displayed. A complete set of results can be found in Results Review.                Passed - In person appointment or virtual visit in the past 6 months     Recent Outpatient Visits              2 months ago Well adult exam    [facility], [facility], APR    Office Visit    7 months ago Tinnitus aurium, bilateral    [facility], [address], [place], [place], [employer] Visit    7 months ago Tinnitus of both ears    [facility], [address], Yun Copeland MD    Office Visit    8 months ago Type 2 diabetes mellitus without complication, without long-term current use of insulin [facility])    Ayana Medina, Cristine Wetzel MD    Office Visit    10 months ago Need for vaccination    Braulio Gustafson    Nurse Only          Future Appointments         Provider Department Appt Notes    In 2 months [facility], [address], [facility]    In 4 months MD Ayana Martell [place] Annual physical               Passed - [facility] or [facility] > 50     GFR Evaluation  EGFRCR: 71 , resulted on 8/4/2023            aspirin (ASPIRIN LOW DOSE) 81 MG Oral Tab EC 90 tablet 3     Sig: Take 1 tablet (81 mg total) by mouth daily.        Aspirin Protocol Passed - 9/6/2023  9:21 PM        Passed - In person appointment or virtual visit in the past 6 mos or appointment in next 3 mos     Recent Outpatient Visits              2 months ago Well adult exam    Ayana Medina, [address] [facility], [facility]    Office Visit    7 months ago Tinnitus aurium, bilateral    [facility], [address], [place], [place], [address], [employer] Visit    7 months ago Tinnitus of both ears    Sheryl Shannon, [address] [facility] [address], Yun Copeland MD    Office Visit    8 months ago Type 2 diabetes mellitus without complication, without long-term current use of insulin [facility])    Sheryl Shannon, [address], Cristine Wetzel MD    Office Visit    10 months ago Need for vaccination    Braulio Gustafson    Nurse Only          Future Appointments         Provider Department Appt Notes    In 2 months [facility], [address], [facility] CLN w. MAC @ [facility]    In 4 months Alesia Avila MD [facility], [place] Annual physical                 rosuvastatin 40 MG Oral Tab 90 tablet 3     Sig: Take 1 tablet (40 mg total) by mouth nightly. Cholesterol Medication Protocol Passed - 9/6/2023  9:21 PM        Passed - ALT in past 12 months        Passed - LDL in past 12 months        Passed - Last ALT < 80     Lab Results   Component Value Date    ALT 34 08/04/2023             Passed - Last LDL < 130     Lab Results   Component Value Date    LDL 31 08/04/2023             Passed - In person appointment or virtual visit in the past 12 mos or appointment in next 3 mos     Recent Outpatient Visits              2 months ago Well adult exam    [address] [place] [address], [address], APRN    Office Visit    7 months ago Tinnitus aurium, bilateral    [facility], [address], [place], [place], [address], [employer] Visit    7 months ago Tinnitus of both ears    [address], [address], Claudia Copeland MD    Office Visit    8 months ago Type 2 diabetes mellitus without complication, without long-term current use of insulin [facility])    [address] [place] [address], Agapito Nixon MD    Office Visit    10 months ago Need for vaccination    [address] [place] [address], [place]    Nurse Only          Future Appointments         Provider Department Appt Notes    In 2 months [address] [place] [address], [address], [place] CLN w. MAC @ [facility]    In 4 months Alesia Avila MD [address] [place] [address], [place] Annual physical                 levothyroxine 100 MCG Oral Tab 90 tablet 3     Sig: Take 1 tablet (100 mcg total) by mouth daily.        Thyroid Medication Protocol Passed - 9/6/2023  9:21 PM        Passed - TSH in past 12 months        Passed - Last TSH value is normal     Lab Results   Component Value Date    TSH 1.530 08/04/2023                 Passed - In person appointment or virtual visit in the past 12 mos or appointment in next 3 mos     Recent Outpatient Visits              2 months ago Well adult exam    [address] [facility], [facility] [address], APRN    Office Visit    7 months ago Tinnitus aurium, bilateral    [facility], [address], [place], [place], [address], [employer] Visit    7 months ago Tinnitus of both ears    [address], [address], [place], Braxton Schwab, MD    Office Visit    8 months ago Type 2 diabetes mellitus without complication, without long-term current use of insulin [facility])    [address], [address], Basil Mercado MD    Office Visit    10 months ago Need for vaccination    [address] [place] [address]    Nurse Only          Future Appointments         Provider Department Appt Notes    In 2 months [facility], [address], [place] CLN w. MAC @ [facility]    In 4 months Yashira Fernandez MD [address] [place] [address] Annual physical                  Future Appointments         Provider Department Appt Notes    In 2 months [facility], PROCEDURE [facility] [place] [facility], [address], [place] CLN w.  MAC @ [place]    In 4 months Yashira Fernandez MD [address] [place] [address] Annual physical          Recent Outpatient Visits              2 months ago Well adult exam    [address] [facility], [facility], APRN    Office Visit    7 months ago Tinnitus aurium, bilateral    [facility], [address], [place], [place], [place], [employer] Visit    7 months ago Tinnitus of both [address], [address], [place], Braxton Schwab, MD    Office Visit    8 months ago Type 2 diabetes mellitus without complication, without long-term current use of insulin [facility])    Dahlia Kam MD    Office Visit    10 months ago Need for vaccination    Braulio Kam    Nurse Only

## 2023-09-07 NOTE — TELEPHONE ENCOUNTER
Medication pended and routed per protocol. Requested Prescriptions     Pending Prescriptions Disp Refills    albuterol 108 (90 Base) MCG/ACT Inhalation Aero Soln 48 g 3     Sig: Inhale 2 puffs into the lungs every 4 to 6 hours as needed for Wheezing or Shortness of Breath.    metFORMIN  MG Oral Tablet 24 Hr 90 tablet 3     Sig: Take 1 tablet (500 mg total) by mouth daily with breakfast.    lisinopril 10 MG Oral Tab 90 tablet 3     Sig: Take 1 tablet (10 mg total) by mouth daily. aspirin (ASPIRIN LOW DOSE) 81 MG Oral Tab EC 90 tablet 3     Sig: Take 1 tablet (81 mg total) by mouth daily. rosuvastatin 40 MG Oral Tab 90 tablet 3     Sig: Take 1 tablet (40 mg total) by mouth nightly. levothyroxine 100 MCG Oral Tab 90 tablet 3     Sig: Take 1 tablet (100 mcg total) by mouth daily.        Last Office Visit with PCP: 12/17/2022

## 2023-10-27 ENCOUNTER — NURSE ONLY (OUTPATIENT)
Dept: FAMILY MEDICINE CLINIC | Facility: CLINIC | Age: 63
End: 2023-10-27

## 2023-10-27 DIAGNOSIS — Z23 NEED FOR VACCINATION: Primary | ICD-10-CM

## 2023-11-20 ENCOUNTER — ANESTHESIA EVENT (OUTPATIENT)
Dept: ENDOSCOPY | Age: 63
End: 2023-11-20
Payer: MEDICARE

## 2023-11-20 ENCOUNTER — HOSPITAL ENCOUNTER (OUTPATIENT)
Age: 63
Setting detail: HOSPITAL OUTPATIENT SURGERY
Discharge: HOME | End: 2023-11-20
Attending: STUDENT IN AN ORGANIZED HEALTH CARE EDUCATION/TRAINING PROGRAM | Admitting: STUDENT IN AN ORGANIZED HEALTH CARE EDUCATION/TRAINING PROGRAM
Payer: MEDICARE

## 2023-11-20 ENCOUNTER — ANESTHESIA (OUTPATIENT)
Dept: ENDOSCOPY | Age: 63
End: 2023-11-20
Payer: MEDICARE

## 2023-11-20 VITALS
HEIGHT: 70 IN | OXYGEN SATURATION: 97 % | WEIGHT: 205 LBS | SYSTOLIC BLOOD PRESSURE: 123 MMHG | DIASTOLIC BLOOD PRESSURE: 78 MMHG | HEART RATE: 63 BPM | RESPIRATION RATE: 14 BRPM | TEMPERATURE: 97 F | BODY MASS INDEX: 29.35 KG/M2

## 2023-11-20 DIAGNOSIS — Z12.11 SPECIAL SCREENING FOR MALIGNANT NEOPLASMS, COLON: ICD-10-CM

## 2023-11-20 DIAGNOSIS — Z86.010 HISTORY OF ADENOMATOUS POLYP OF COLON: ICD-10-CM

## 2023-11-20 LAB — GLUCOSE BLDC GLUCOMTR-MCNC: 94 MG/DL (ref 70–99)

## 2023-11-20 PROCEDURE — 88305 TISSUE EXAM BY PATHOLOGIST: CPT | Performed by: STUDENT IN AN ORGANIZED HEALTH CARE EDUCATION/TRAINING PROGRAM

## 2023-11-20 PROCEDURE — 82962 GLUCOSE BLOOD TEST: CPT

## 2023-11-20 RX ORDER — EPHEDRINE SULFATE 50 MG/ML
INJECTION, SOLUTION INTRAVENOUS AS NEEDED
Status: DISCONTINUED | OUTPATIENT
Start: 2023-11-20 | End: 2023-11-20 | Stop reason: SURG

## 2023-11-20 RX ORDER — NALOXONE HYDROCHLORIDE 0.4 MG/ML
0.08 INJECTION, SOLUTION INTRAMUSCULAR; INTRAVENOUS; SUBCUTANEOUS ONCE AS NEEDED
Status: DISCONTINUED | OUTPATIENT
Start: 2023-11-20 | End: 2023-11-20

## 2023-11-20 RX ORDER — SODIUM CHLORIDE, SODIUM LACTATE, POTASSIUM CHLORIDE, CALCIUM CHLORIDE 600; 310; 30; 20 MG/100ML; MG/100ML; MG/100ML; MG/100ML
INJECTION, SOLUTION INTRAVENOUS CONTINUOUS
Status: DISCONTINUED | OUTPATIENT
Start: 2023-11-20 | End: 2023-11-20

## 2023-11-20 RX ORDER — LIDOCAINE HYDROCHLORIDE 10 MG/ML
INJECTION, SOLUTION EPIDURAL; INFILTRATION; INTRACAUDAL; PERINEURAL AS NEEDED
Status: DISCONTINUED | OUTPATIENT
Start: 2023-11-20 | End: 2023-11-20 | Stop reason: SURG

## 2023-11-20 RX ORDER — SODIUM CHLORIDE 9 MG/ML
INJECTION, SOLUTION INTRAVENOUS CONTINUOUS
Status: DISCONTINUED | OUTPATIENT
Start: 2023-11-20 | End: 2023-11-20

## 2023-11-20 RX ADMIN — LIDOCAINE HYDROCHLORIDE 50 MG: 10 INJECTION, SOLUTION EPIDURAL; INFILTRATION; INTRACAUDAL; PERINEURAL at 10:26:00

## 2023-11-20 RX ADMIN — EPHEDRINE SULFATE 10 MG: 50 INJECTION, SOLUTION INTRAVENOUS at 11:03:00

## 2023-11-20 RX ADMIN — SODIUM CHLORIDE: 9 INJECTION, SOLUTION INTRAVENOUS at 10:26:00

## 2023-11-20 NOTE — OPERATIVE REPORT
COLONOSCOPY REPORT    Brittany Lind     1960 Age 70year old   PCP Dante Espinosa MD Endoscopist Jadyn Gunter MD       Date of procedure: 23    Procedure: Colonoscopy w/ cold snare polypectomy    Pre-operative diagnosis: hx of colon polyp    Post-operative diagnosis: polyp    Medications: MAC    Withdrawal time: 16 minutes    Procedure:  Informed consent was obtained from the patient after the risks of the procedure were discussed, including but not limited to bleeding, perforation, aspiration, infection, or possibility of a missed lesion. After discussions of the risks/benefits and alternatives to this procedure, as well as the planned sedation, the patient was placed in the left lateral decubitus position and begun on continuous blood pressure pulse oximetry and EKG monitoring and this was maintained throughout the procedure. Once an adequate level of sedation was obtained a digital rectal exam was completed. Then the lubricated tip of the [id number] diagnostic video colonoscope was inserted and advanced without difficulty to the cecum using water immersion and CO2 insufflation technique. The cecum was identified by localizing the trifold, the appendix and the ileocecal valve. Withdrawal was begun with thorough washing and careful examination of the colonic walls and folds. A routine second examination of the cecum/ascending colon was performed. Photodocumentation was obtained. The bowel prep was good. Views of the colon were good with washing. I then carefully withdrew the instrument from the patient who tolerated the procedure well. Complications: none. Findings:   1. 1 polyp noted as follows:      A. 4 mm polyp in the transverse colon; sessile morphology; cold snare polypectomy performed, polyp retrieved. 2. Diverticulosis: none visualized.     3. Ileocecal valve appeared healthy and normal. Terminal ileum was intubated and appeared normal.    4. The colonic mucosa throughout the colon showed normal vascular pattern, without evidence of angioectasias or inflammation. 5. A retroflexed view of the rectum was unremarkable. 6. FAITH: normal rectal tone, no masses palpated. Impression:   4 mm polyp resected. Normal terminal ileum. The colon was otherwise normal with glistening mucosa and intact vascular pattern. Recommend:  Await pathology. The interval for the next colonoscopy will be determined after reviewing pathology. If new signs or symptoms develop, colonoscopy may need to be repeated sooner. High fiber diet. Monitor for blood in the stool. If having more than just tinge of blood, call office or go to the ER. Repeat in 7 years.    >>>If tissue was obtained and you have not received your pathology results either by phone or letter within 2 weeks, please call our office at [phone].     Specimens: polyp    Blood loss: <1 ml

## 2023-11-20 NOTE — H&P
History & Physical Examination    Patient Name: Yrn Valladares  MRN: [id number]  CSN: [id number]  YOB: 1960    Diagnosis: history of colon polyps, surveillance colonoscopy    Medications Prior to Admission   Medication Sig Dispense Refill Last Dose    albuterol 108 (90 Base) MCG/ACT Inhalation Aero Soln Inhale 2 puffs into the lungs every 4 to 6 hours as needed for Wheezing or Shortness of Breath. 48 g 3     metFORMIN  MG Oral Tablet 24 Hr Take 1 tablet (500 mg total) by mouth daily with breakfast. 90 tablet 3     lisinopril 10 MG Oral Tab Take 1 tablet (10 mg total) by mouth daily. 90 tablet 3     aspirin (ASPIRIN LOW DOSE) 81 MG Oral Tab EC Take 1 tablet (81 mg total) by mouth daily. 90 tablet 3     rosuvastatin 40 MG Oral Tab Take 1 tablet (40 mg total) by mouth nightly. 90 tablet 3     levothyroxine 100 MCG Oral Tab Take 1 tablet (100 mcg total) by mouth daily. 90 tablet 3     Cholecalciferol (VITAMIN D) 50 MCG (2000 UT) Oral Cap Take by mouth. Ascorbic Acid (VITAMIN C) 100 MG Oral Tab Take 1 tablet (100 mg total) by mouth daily. Na Sulfate-K Sulfate-Mg Sulf (SUPREP BOWEL PREP KIT) 17.5-3.13-1.6 GM/177ML Oral Solution Take as discussed in clinic 1 each 0      Current Facility-Administered Medications   Medication Dose Route Frequency    sodium chloride 0.9% infusion   Intravenous Continuous       Allergies: Allergies   Allergen Reactions    Sodium SWELLING     Sodium pentathol    Pcn [Penicillins] UNKNOWN     As a child     Nitrous Oxide NAUSEA ONLY       Past Medical History:   Diagnosis Date    Acute hepatitis     Hep A    BPH 2018    GP diagnosed.  Upcoming urologist appt on 18    BPH (benign prostatic hyperplasia)     Colon adenoma 2018    repeat colonoscopy 2023    COPD (chronic obstructive pulmonary disease) ([facility]     Coronary atherosclerosis     Decorative tattoo     Diabetes ([facility]     Exposure to hepatitis     Hep A    Fetal and  jaundice When I had HEP    Hemorrhoids 08/22/2018    1    Hepatitis     High blood pressure     High cholesterol     Lupus ([address]     Not me but my sister has Lupus    Neuropathy     Osteoarthritis     Other and unspecified hyperlipidemia     Pneumonia due to organism     PONV (postoperative nausea and vomiting)     Pulmonary emphysema (HCC)     Visual impairment     glasses     Past Surgical History:   Procedure Laterality Date    ANESTH,SURGERY OF SHOULDER      COLONOSCOPY  2003    COLONOSCOPY  09/19/2018    TONSILLECTOMY      VASECTOMY       Family History   Problem Relation Age of Onset    Heart Disorder Father     Hypertension Father     Cancer Father         Prostate Cancer -     Other (Other) Father         Alzheimer's    Heart Disease Mother     Arthritis Sister         RA    Heart Disease Sister     Other (Other) Sister         Lupus    Colon Cancer Paternal Uncle      Social History     Tobacco Use    Smoking status: Former     Packs/day: 1.50     Years: 40.00     Additional pack years: 0.00     Total pack years: 60.00     Types: Cigarettes     Quit date: 11/30/2019     Years since quitting: 3.9    Smokeless tobacco: Never   Substance Use Topics    Alcohol use: Yes     Alcohol/week: 2.0 standard drinks of alcohol     Comment: socially       SYSTEM Check if Review is Normal Check if Physical Exam is Normal If not normal, please explain:   SUMIT Viera ] [ Rudy Viera ] [ Toney Viera ] [ Bladimir Viera ] [ Yasmani Viera ] [ Cholo Viera ] [ X]    OTHER        I have discussed the risks and benefits and alternatives of the procedure with the patient/family. They understand and agree to proceed with plan of care. I have reviewed the History and Physical done within the last 30 days. Any changes noted above.     Desirae Tee MD  [facility], [facility]

## 2023-11-20 NOTE — DISCHARGE INSTRUCTIONS

## 2023-11-22 ENCOUNTER — TELEPHONE (OUTPATIENT)
Facility: CLINIC | Age: 63
End: 2023-11-22

## 2023-11-22 NOTE — TELEPHONE ENCOUNTER
Recall colonoscopy in 7 years per Dr Trevon Sam.     Colon done 11/20/2023    Health maintenance updated and message sent to patient outreach to repeat colonoscopy in 7 years

## 2023-11-22 NOTE — TELEPHONE ENCOUNTER
----- Message from Ashley Gregorio MD sent at 11/22/2023  7:41 AM CST -----  One adenoma removed on recent colonoscopy, should have repeat in 7 years. Mychart sent to patient. GI Staff:    Can you please place a recall for this patient to have a colonoscopy repeated in 7 years. Thank you.     Ashley Gregorio MD

## 2023-11-22 NOTE — PROGRESS NOTES
One adenoma removed on recent colonoscopy, should have repeat in 7 years. Mychart sent to patient. GI Staff:    Can you please place a recall for this patient to have a colonoscopy repeated in 7 years. Thank you.     Padmini Murry MD

## 2023-11-22 NOTE — TELEPHONE ENCOUNTER
----- Message from Dylan Orlando MD sent at 11/22/2023  7:41 AM CST -----  One adenoma removed on recent colonoscopy, should have repeat in 7 years. Mychart sent to patient. GI Staff:    Can you please place a recall for this patient to have a colonoscopy repeated in 7 years. Thank you.     Dylan Orlando MD

## 2024-01-20 ENCOUNTER — HOSPITAL ENCOUNTER (OUTPATIENT)
Dept: GENERAL RADIOLOGY | Age: 64
Discharge: HOME | End: 2024-01-20
Attending: FAMILY MEDICINE
Payer: MEDICARE

## 2024-01-20 ENCOUNTER — LAB ENCOUNTER (OUTPATIENT)
Dept: LAB | Age: 64
End: 2024-01-20
Attending: FAMILY MEDICINE
Payer: MEDICARE

## 2024-01-20 ENCOUNTER — OFFICE VISIT (OUTPATIENT)
Dept: FAMILY MEDICINE CLINIC | Facility: CLINIC | Age: 64
End: 2024-01-20
Payer: MEDICARE

## 2024-01-20 VITALS
DIASTOLIC BLOOD PRESSURE: 73 MMHG | BODY MASS INDEX: 32 KG/M2 | HEART RATE: 67 BPM | WEIGHT: 219.81 LBS | SYSTOLIC BLOOD PRESSURE: 112 MMHG

## 2024-01-20 DIAGNOSIS — I25.10 CORONARY ARTERY DISEASE INVOLVING NATIVE CORONARY ARTERY OF NATIVE HEART WITHOUT ANGINA PECTORIS: ICD-10-CM

## 2024-01-20 DIAGNOSIS — E55.9 VITAMIN D DEFICIENCY: Primary | ICD-10-CM

## 2024-01-20 DIAGNOSIS — M25.571 PAIN AND SWELLING OF RIGHT ANKLE: ICD-10-CM

## 2024-01-20 DIAGNOSIS — E11.9 TYPE 2 DIABETES MELLITUS WITHOUT COMPLICATION, WITHOUT LONG-TERM CURRENT USE OF INSULIN (HCC): ICD-10-CM

## 2024-01-20 DIAGNOSIS — M25.471 PAIN AND SWELLING OF RIGHT ANKLE: ICD-10-CM

## 2024-01-20 DIAGNOSIS — I65.22 LEFT CAROTID STENOSIS: ICD-10-CM

## 2024-01-20 DIAGNOSIS — Z87.891 HISTORY OF TOBACCO USE: ICD-10-CM

## 2024-01-20 DIAGNOSIS — E55.9 VITAMIN D DEFICIENCY: ICD-10-CM

## 2024-01-20 DIAGNOSIS — Z00.00 ENCOUNTER FOR ANNUAL HEALTH EXAMINATION: ICD-10-CM

## 2024-01-20 DIAGNOSIS — E78.5 HYPERLIPIDEMIA, UNSPECIFIED HYPERLIPIDEMIA TYPE: ICD-10-CM

## 2024-01-20 PROBLEM — R42 DIZZINESS: Status: RESOLVED | Noted: 2019-12-16 | Resolved: 2024-01-20

## 2024-01-20 PROBLEM — E78.00 PURE HYPERCHOLESTEROLEMIA: Status: RESOLVED | Noted: 2019-12-16 | Resolved: 2024-01-20

## 2024-01-20 PROBLEM — E11.40 TYPE 2 DIABETES MELLITUS WITH DIABETIC NEUROPATHY (HCC): Status: RESOLVED | Noted: 2022-12-17 | Resolved: 2024-01-20

## 2024-01-20 PROBLEM — R73.03 PREDIABETES: Status: RESOLVED | Noted: 2022-06-18 | Resolved: 2024-01-20

## 2024-01-20 LAB
ALBUMIN SERPL-MCNC: 4.6 G/DL (ref 3.2–4.8)
ALBUMIN/GLOB SERPL: 1.3 {RATIO} (ref 1–2)
ALP LIVER SERPL-CCNC: 62 U/L
ALT SERPL-CCNC: 32 U/L
ANION GAP SERPL CALC-SCNC: 9 MMOL/L (ref 0–18)
AST SERPL-CCNC: 26 U/L (ref ?–34)
BASOPHILS # BLD AUTO: 0.07 X10(3) UL (ref 0–0.2)
BASOPHILS NFR BLD AUTO: 0.9 %
BILIRUB SERPL-MCNC: 0.7 MG/DL (ref 0.2–1.1)
BUN BLD-MCNC: 17 MG/DL (ref 9–23)
BUN/CREAT SERPL: 13.4 (ref 10–20)
CALCIUM BLD-MCNC: 10.4 MG/DL (ref 8.7–10.4)
CHLORIDE SERPL-SCNC: 107 MMOL/L (ref 98–112)
CHOLEST SERPL-MCNC: 122 MG/DL (ref ?–200)
CO2 SERPL-SCNC: 26 MMOL/L (ref 21–32)
CREAT BLD-MCNC: 1.27 MG/DL
CREAT UR-SCNC: 133.7 MG/DL
DEPRECATED RDW RBC AUTO: 48.3 FL (ref 35.1–46.3)
EGFRCR SERPLBLD CKD-EPI 2021: 63 ML/MIN/1.73M2 (ref 60–?)
EOSINOPHIL # BLD AUTO: 0.16 X10(3) UL (ref 0–0.7)
EOSINOPHIL NFR BLD AUTO: 2.1 %
ERYTHROCYTE [DISTWIDTH] IN BLOOD BY AUTOMATED COUNT: 13.5 % (ref 11–15)
EST. AVERAGE GLUCOSE BLD GHB EST-MCNC: 148 MG/DL (ref 68–126)
FASTING PATIENT LIPID ANSWER: YES
FASTING STATUS PATIENT QL REPORTED: YES
GLOBULIN PLAS-MCNC: 3.6 G/DL (ref 2.8–4.4)
GLUCOSE BLD-MCNC: 120 MG/DL (ref 70–99)
HBA1C MFR BLD: 6.8 % (ref ?–5.7)
HCT VFR BLD AUTO: 49.1 %
HDLC SERPL-MCNC: 44 MG/DL (ref 40–59)
HGB BLD-MCNC: 16 G/DL
IMM GRANULOCYTES # BLD AUTO: 0.02 X10(3) UL (ref 0–1)
IMM GRANULOCYTES NFR BLD: 0.3 %
LDLC SERPL CALC-MCNC: 53 MG/DL (ref ?–100)
LYMPHOCYTES # BLD AUTO: 1.95 X10(3) UL (ref 1–4)
LYMPHOCYTES NFR BLD AUTO: 25.6 %
MCH RBC QN AUTO: 31.1 PG (ref 26–34)
MCHC RBC AUTO-ENTMCNC: 32.6 G/DL (ref 31–37)
MCV RBC AUTO: 95.3 FL
MICROALBUMIN UR-MCNC: 7.3 MG/DL
MICROALBUMIN/CREAT 24H UR-RTO: 54.6 UG/MG (ref ?–30)
MONOCYTES # BLD AUTO: 0.63 X10(3) UL (ref 0.1–1)
MONOCYTES NFR BLD AUTO: 8.3 %
NEUTROPHILS # BLD AUTO: 4.79 X10 (3) UL (ref 1.5–7.7)
NEUTROPHILS # BLD AUTO: 4.79 X10(3) UL (ref 1.5–7.7)
NEUTROPHILS NFR BLD AUTO: 62.8 %
NONHDLC SERPL-MCNC: 78 MG/DL (ref ?–130)
OSMOLALITY SERPL CALC.SUM OF ELEC: 297 MOSM/KG (ref 275–295)
PLATELET # BLD AUTO: 183 10(3)UL (ref 150–450)
POTASSIUM SERPL-SCNC: 4.7 MMOL/L (ref 3.5–5.1)
PROT SERPL-MCNC: 8.2 G/DL (ref 5.7–8.2)
RBC # BLD AUTO: 5.15 X10(6)UL
SODIUM SERPL-SCNC: 142 MMOL/L (ref 136–145)
TRIGL SERPL-MCNC: 144 MG/DL (ref 30–149)
TSI SER-ACNC: 2.41 MIU/ML (ref 0.55–4.78)
VIT B12 SERPL-MCNC: 634 PG/ML (ref 211–911)
VIT D+METAB SERPL-MCNC: 51.2 NG/ML (ref 30–100)
VLDLC SERPL CALC-MCNC: 21 MG/DL (ref 0–30)
WBC # BLD AUTO: 7.6 X10(3) UL (ref 4–11)

## 2024-01-20 PROCEDURE — 99213 OFFICE O/P EST LOW 20 MIN: CPT | Performed by: FAMILY MEDICINE

## 2024-01-20 PROCEDURE — G0402 INITIAL PREVENTIVE EXAM: HCPCS | Performed by: FAMILY MEDICINE

## 2024-01-20 PROCEDURE — 80053 COMPREHEN METABOLIC PANEL: CPT

## 2024-01-20 PROCEDURE — 82570 ASSAY OF URINE CREATININE: CPT

## 2024-01-20 PROCEDURE — 80061 LIPID PANEL: CPT

## 2024-01-20 PROCEDURE — 82306 VITAMIN D 25 HYDROXY: CPT

## 2024-01-20 PROCEDURE — 82607 VITAMIN B-12: CPT

## 2024-01-20 PROCEDURE — 90677 PCV20 VACCINE IM: CPT | Performed by: FAMILY MEDICINE

## 2024-01-20 PROCEDURE — 84443 ASSAY THYROID STIM HORMONE: CPT

## 2024-01-20 PROCEDURE — 36415 COLL VENOUS BLD VENIPUNCTURE: CPT

## 2024-01-20 PROCEDURE — 83036 HEMOGLOBIN GLYCOSYLATED A1C: CPT

## 2024-01-20 PROCEDURE — 85025 COMPLETE CBC W/AUTO DIFF WBC: CPT

## 2024-01-20 PROCEDURE — 73610 X-RAY EXAM OF ANKLE: CPT | Performed by: FAMILY MEDICINE

## 2024-01-20 PROCEDURE — 82043 UR ALBUMIN QUANTITATIVE: CPT

## 2024-01-20 PROCEDURE — G0009 ADMIN PNEUMOCOCCAL VACCINE: HCPCS | Performed by: FAMILY MEDICINE

## 2024-01-20 NOTE — PATIENT INSTRUCTIONS
Terrance Jordan's SCREENING SCHEDULE   Tests on this list are recommended by your physician but may not be covered, or covered at this frequency, by your insurer.   Please check with your insurance carrier before scheduling to verify coverage.   PREVENTATIVE SERVICES FREQUENCY &  COVERAGE DETAILS LAST COMPLETION DATE   Diabetes Screening    Fasting Blood Sugar / Glucose    One screening every 12 months if never tested or if previously tested but not diagnosed with pre-diabetes   One screening every 6 months if diagnosed with pre-diabetes Lab Results   Component Value Date    GLUCOSE 135 (H) 07/18/2011    GLU 93 08/04/2023        Cardiovascular Disease Screening    Lipid Panel  Cholesterol  Lipoprotein (HDL)  Triglycerides Covered every 5 years for all Medicare beneficiaries without apparent signs or symptoms of cardiovascular disease Lab Results   Component Value Date    CHOLEST 98 08/04/2023    HDL 46 08/04/2023    LDL 31 08/04/2023    TRIG 120 08/04/2023         Electrocardiogram (EKG)   Covered if needed at Welcome to Medicare, and non-screening if indicated for medical reasons 08/22/2018      Ultrasound Screening for Abdominal Aortic Aneurysm (AAA) Covered once in a lifetime for one of the following risk factors   • Men who are 65-75 years old and have ever smoked   • Anyone with a family history -     Colorectal Cancer Screening  Covered for ages 50-85; only need ONE of the following:    Colonoscopy   Covered every 10 years    Covered every 2 years if patient is at high risk or previous colonoscopy was abnormal 11/20/2023    Health Maintenance   Topic Date Due   • Colorectal Cancer Screening  11/20/2030       Flexible Sigmoidoscopy   Covered every 4 years -    Fecal Occult Blood Test Covered annually -   Prostate Cancer Screening    Prostate-Specific Antigen (PSA) Annually Lab Results   Component Value Date    PSA 1.3 08/22/2018     Health Maintenance   Topic Date Due   • PSA  12/17/2024      Immunizations     Influenza Covered once per flu season  Please get every year 10/27/2023  No recommendations at this time    Pneumococcal Each vaccine ([id number] & [id number]) covered once after 65 Prevnar 13: 09/29/2021    [id number]: 10/27/2012     Pneumococcal Vaccination(3 of 3 - PPSV23 or PCV20) due on 08/04/2023    Hepatitis B One screening covered for patients with certain risk factors   -  No recommendations at this time    Tetanus Toxoid Not covered by Medicare Part B unless medically necessary (cut with metal); may be covered with your pharmacy prescription benefits -    Tetanus, Diptheria and Pertusis TD and TDaP Not covered by Medicare Part B -  No recommendations at this time    Zoster Not covered by Medicare Part B; may be covered with your pharmacy  prescription benefits -  No recommendations at this time     Diabetes      Hemoglobin A1C Annually; if last result is elevated, may be asked to retest more frequently.    Medicare covers every 3 months Lab Results   Component Value Date     (H) 08/04/2023    A1C 6.9 (H) 08/04/2023       Hemoglobin A1C Test due on 02/04/2024    Creat/alb ratio Annually Lab Results   Component Value Date    MICROALBCREA 14.8 08/04/2023       LDL Annually Lab Results   Component Value Date    LDL 31 08/04/2023       Dilated Eye Exam Annually Last Diabetic Eye Exam:  Last Dilated Eye Exam: 08/04/23  Eye Exam shows Diabetic Retinopathy?: No       Annual Monitoring of Persistent Medications (ACE/ARB, digoxin diuretics, anticonvulsants)    Potassium Annually Lab Results   Component Value Date    K 4.2 08/04/2023         Creatinine   Annually Lab Results   Component Value Date    CREATSERUM 1.15 08/04/2023         BUN Annually Lab Results   Component Value Date    BUN 14 08/04/2023       Drug Serum Conc Annually No results found for: \"DIGOXIN\", \"DIG\", \"VALP\"

## 2024-01-20 NOTE — PROGRESS NOTES
Subjective:   Terrance Jordan is a 63 year old male who presents for a Medicare Initial Preventative Physical Exam (Welcome to Medicare- < 12 months on Medicare) and scheduled follow up of multiple significant but stable problems.   Having issues with right ankle - prior fracture in the 1978. Reports pain all of the time.   Sees vascular for carotids   History/Other:   Fall Risk Assessment:   He has been screened for Falls and is low risk.      Cognitive Assessment:   He had a completely normal cognitive assessment - see flowsheet entries     Functional Ability/Status:   Terrance Jordan has some abnormal functions as listed below:  He has Hearing problems based on screening of functional status.He has Vision problems based on screening of functional status. He has Walking problems based on screening of functional status.       Depression Screening (PHQ-2/PHQ-9): PHQ-2 SCORE: 0, done 1/20/2024        Advanced Directives:   He does NOT have a Living Will. [ ]  He does NOT have a Power of  for Health Care. [ ]  Discussed Advance Care Planning with patient (and family/surrogate if present). Standard forms made available to patient in After Visit Summary.      Patient Active Problem List   Diagnosis    Coronary artery disease involving native coronary artery of native heart without angina pectoris    Left carotid stenosis    Hyperlipidemia    Type 2 diabetes mellitus without complication, without long-term current use of insulin (HCC)     Allergies:  He is allergic to sodium, pcn [penicillins], and nitrous oxide.    Current Medications:  Outpatient Medications Marked as Taking for the 1/20/24 encounter (Office Visit) with Khushi Brown MD   Medication Sig    albuterol 108 (90 Base) MCG/ACT Inhalation Aero Soln Inhale 2 puffs into the lungs every 4 to 6 hours as needed for Wheezing or Shortness of Breath.    metFORMIN  MG Oral Tablet 24 Hr Take 1 tablet (500 mg total) by mouth daily with breakfast.     lisinopril 10 MG Oral Tab Take 1 tablet (10 mg total) by mouth daily.    aspirin (ASPIRIN LOW DOSE) 81 MG Oral Tab EC Take 1 tablet (81 mg total) by mouth daily.    rosuvastatin 40 MG Oral Tab Take 1 tablet (40 mg total) by mouth nightly.    levothyroxine 100 MCG Oral Tab Take 1 tablet (100 mcg total) by mouth daily.    Cholecalciferol (VITAMIN D) 50 MCG (2000 UT) Oral Cap Take by mouth.    Ascorbic Acid (VITAMIN C) 100 MG Oral Tab Take 1 tablet (100 mg total) by mouth daily.       Medical History:  He  has a past medical history of Acute hepatitis (), BPH (2018), BPH (benign prostatic hyperplasia), Colon adenoma (2018), COPD (chronic obstructive pulmonary disease) (HCC), Coronary atherosclerosis, Decorative tattoo, Diabetes (HCC), Disorder of thyroid, Essential hypertension (1/15/21), Exposure to hepatitis (), Fetal and  jaundice (When I had HEP), Hemorrhoids (2018), Hepatitis, High blood pressure, High cholesterol, Lupus (HCC), Neuropathy, Osteoarthritis, Other and unspecified hyperlipidemia, Pneumonia due to organism, PONV (postoperative nausea and vomiting), Pulmonary emphysema (HCC), and Visual impairment.  Surgical History:  He  has a past surgical history that includes vasectomy; tonsillectomy; anesth,surgery of shoulder; colonoscopy (); colonoscopy (2018); colonoscopy (N/A, 2023); and other surgical history ().   Family History:  His family history includes Arthritis in his sister; Cancer in his father; Colon Cancer in his paternal uncle; Dementia in his father; Diabetes in his father and mother; Heart Disease in his mother and sister; Heart Disorder in his father and mother; Hypertension in his father and mother; Other in his father and sister; Stroke in his mother.  Social History:  He  reports that he quit smoking about 4 years ago. His smoking use included cigarettes. He has a 40 pack-year smoking history. He has never used smokeless  tobacco. He reports current alcohol use of about 2.0 standard drinks of alcohol per week. He reports that he does not use drugs.    Tobacco:  He smoked tobacco in the past but quit greater than 12 months ago.  Social History    Tobacco Use      Smoking status: Former        Packs/day: 1.00        Years: 40.00        Additional pack years: 0.00        Total pack years: 40.00        Types: Cigarettes        Quit date: 2019        Years since quittin.1      Smokeless tobacco: Never         CAGE Alcohol Screen:   CAGE screening score of 0 on 2024, showing low risk of alcohol abuse.      Patient Care Team:  Khushi Brown MD as PCP - General  Juan Carlos Jordan MD (OTOLARYNGOLOGY)    Review of Systems     Negative except ankle pain and hip pain.     Objective:   Physical Exam  Constitutional:       Appearance: Normal appearance.   HENT:      Right Ear: Tympanic membrane normal.      Left Ear: Tympanic membrane normal.   Eyes:      Conjunctiva/sclera: Conjunctivae normal.   Cardiovascular:      Rate and Rhythm: Normal rate and regular rhythm.      Pulses: Normal pulses.      Heart sounds: Normal heart sounds.   Pulmonary:      Effort: Pulmonary effort is normal.      Breath sounds: Normal breath sounds.   Abdominal:      General: Bowel sounds are normal.   Neurological:      Mental Status: He is alert and oriented to person, place, and time.   Psychiatric:         Mood and Affect: Mood normal.         Behavior: Behavior normal.     Bilateral barefoot skin diabetic exam is normal, visualized feet and the appearance is normal.  Bilateral monofilament/sensation of both feet is normal.  Pulsation pedal pulse exam of both lower legs/feet is normal as well.       /73   Pulse 67   Wt 219 lb 12.8 oz (99.7 kg)   BMI 31.54 kg/m²  Estimated body mass index is 31.54 kg/m² as calculated from the following:    Height as of 23: 5' 10\" (1.778 m).    Weight as of this encounter: 219 lb 12.8 oz (99.7  kg).    Medicare Hearing Assessment:   Hearing Screening    Time taken: 1/20/2024  9:27 AM  Screening Method: Questionnaire  I have a problem hearing over the telephone: Sometimes I have trouble following the conversations when two or more people are talking at the same time: No   I have trouble understanding things on the TV: No I have to strain to understand conversations: No   I have to worry about missing the telephone ring or doorbell: No I have trouble hearing conversations in a noisy background such as a crowded room or restaurant: No   I get confused about where sounds come from: No I misunderstand some words in a sentence and need to ask people to repeat themselves: No   I especially have trouble understanding the speech of women and children: No I have trouble understanding the speaker in a large room such as at a meeting or place of Taoist: No   Many people I talk to seem to mumble (or don't speak clearly): No People get annoyed because I misunderstand what they say: No   I misunderstand what others are saying and make inappropriate responses: No I avoid social activities because I cannot hear well and fear I will reply improperly: No   Family members and friends have told me they think I may have hearing loss: Yes             Visual Acuity:                              Assessment & Plan:   Terrance Jordan is a 63 year old male who presents for a Medicare Assessment.     1. Vitamin D deficiency    - Vitamin D; Future    2. Coronary artery disease involving native coronary artery of native heart without angina pectoris  Due to see cards. On statin.   - CARDIO - INTERNAL    3. Left carotid stenosis  Stable On statin. Due for follow up/   - Vascular Surgery - In Network    4. Hyperlipidemia, unspecified hyperlipidemia type  Stable On statin.     5. History of tobacco use  Scan due in April   - CT LUNG LD SCREENING(CPT=71271); Future    6. Type 2 diabetes mellitus without complication, without long-term  current use of insulin (HCC)  Due for labs.   - CBC With Differential With Platelet; Future  - Comp Metabolic Panel (14); Future  - Lipid Panel; Future  - Microalb/Creat Ratio, Random Urine; Future  - TSH W Reflex To Free T4; Future  - Hemoglobin A1C; Future  - Vitamin D; Future  - Vitamin B12 [E]; Future    7. Encounter for annual health examination    - CT LUNG LD SCREENING(CPT=71271); Future  - CBC With Differential With Platelet; Future  - Comp Metabolic Panel (14); Future  - Lipid Panel; Future  - Microalb/Creat Ratio, Random Urine; Future  - TSH W Reflex To Free T4; Future  - Hemoglobin A1C; Future  - Vitamin D; Future  - Vitamin B12 [E]; Future    8. Pain and swelling of right ankle    - XR ANKLE (MIN 3 VIEWS), RIGHT (CPT=73610); Future  - Ortho Referral - [place] ([facility])    The patient indicates understanding of these issues and agrees to the plan.  Reinforced healthy diet, lifestyle, and exercise.      Return in 6 months (on 7/20/2024).     Khushi Brown MD, 1/20/2024     Supplementary Documentation:   General Health:           Terrance Jordan's SCREENING SCHEDULE   Tests on this list are recommended by your physician but may not be covered, or covered at this frequency, by your insurer.   Please check with your insurance carrier before scheduling to verify coverage.   PREVENTATIVE SERVICES FREQUENCY &  COVERAGE DETAILS LAST COMPLETION DATE   Diabetes Screening    Fasting Blood Sugar / Glucose    One screening every 12 months if never tested or if previously tested but not diagnosed with pre-diabetes   One screening every 6 months if diagnosed with pre-diabetes Lab Results   Component Value Date    GLUCOSE 135 (H) 07/18/2011    GLU 93 08/04/2023        Cardiovascular Disease Screening    Lipid Panel  Cholesterol  Lipoprotein (HDL)  Triglycerides Covered every 5 years for all Medicare beneficiaries without apparent signs or symptoms of cardiovascular disease Lab Results   Component Value Date     CHOLEST 98 08/04/2023    HDL 46 08/04/2023    LDL 31 08/04/2023    TRIG 120 08/04/2023         Electrocardiogram (EKG)   Covered if needed at Welcome to Medicare, and non-screening if indicated for medical reasons 08/22/2018      Ultrasound Screening for Abdominal Aortic Aneurysm (AAA) Covered once in a lifetime for one of the following risk factors    Men who are 65-75 years old and have ever smoked    Anyone with a family history -     Colorectal Cancer Screening  Covered for ages 50-85; only need ONE of the following:    Colonoscopy   Covered every 10 years    Covered every 2 years if patient is at high risk or previous colonoscopy was abnormal 11/20/2023    Health Maintenance   Topic Date Due    Colorectal Cancer Screening  11/20/2030       Flexible Sigmoidoscopy   Covered every 4 years -    Fecal Occult Blood Test Covered annually -   Prostate Cancer Screening    Prostate-Specific Antigen (PSA) Annually Lab Results   Component Value Date    PSA 1.3 08/22/2018     Health Maintenance   Topic Date Due    PSA  12/17/2024      Immunizations    Influenza Covered once per flu season  Please get every year 10/27/2023  No recommendations at this time    Pneumococcal Each vaccine ([id number] & [id number]) covered once after 65 Prevnar 13: 09/29/2021    [id number]: 10/27/2012     Pneumococcal Vaccination(3 of 3 - PPSV23 or PCV20) due on 08/04/2023    Hepatitis B One screening covered for patients with certain risk factors   -  No recommendations at this time    Tetanus Toxoid Not covered by Medicare Part B unless medically necessary (cut with metal); may be covered with your pharmacy prescription benefits -    Tetanus, Diptheria and Pertusis TD and TDaP Not covered by Medicare Part B -  No recommendations at this time    Zoster Not covered by Medicare Part B; may be covered with your pharmacy  prescription benefits -  No recommendations at this time     Diabetes      Hemoglobin A1C Annually; if last result is  elevated, may be asked to retest more frequently.    Medicare covers every 3 months Lab Results   Component Value Date     (H) 08/04/2023    A1C 6.9 (H) 08/04/2023       Hemoglobin A1C Test due on 02/04/2024    Creat/alb ratio Annually Lab Results   Component Value Date    MICROALBCREA 14.8 08/04/2023       LDL Annually Lab Results   Component Value Date    LDL 31 08/04/2023       Dilated Eye Exam Annually Last Diabetic Eye Exam:  Last Dilated Eye Exam: 08/04/23  Eye Exam shows Diabetic Retinopathy?: No       Annual Monitoring of Persistent Medications (ACE/ARB, digoxin diuretics, anticonvulsants)    Potassium Annually Lab Results   Component Value Date    K 4.2 08/04/2023         Creatinine   Annually Lab Results   Component Value Date    CREATSERUM 1.15 08/04/2023         BUN Annually Lab Results   Component Value Date    BUN 14 08/04/2023       Drug Serum Conc Annually No results found for: \"DIGOXIN\", \"DIG\", \"VALP\"

## 2024-01-23 NOTE — PROGRESS NOTES
There is significant arthritis in your ankle. See ortho as recommended. You may benefit from a steroid injection. - Dr. Brown

## 2024-03-02 ENCOUNTER — PATIENT MESSAGE (OUTPATIENT)
Dept: FAMILY MEDICINE CLINIC | Facility: CLINIC | Age: 64
End: 2024-03-02

## 2024-03-04 ENCOUNTER — OFFICE VISIT (OUTPATIENT)
Dept: ORTHOPEDICS CLINIC | Facility: CLINIC | Age: 64
End: 2024-03-04
Payer: MEDICARE

## 2024-03-04 VITALS — HEART RATE: 77 BPM | DIASTOLIC BLOOD PRESSURE: 86 MMHG | SYSTOLIC BLOOD PRESSURE: 153 MMHG

## 2024-03-04 DIAGNOSIS — M19.071 PRIMARY OSTEOARTHRITIS OF RIGHT ANKLE: Primary | ICD-10-CM

## 2024-03-04 DIAGNOSIS — E66.09 CLASS 1 OBESITY DUE TO EXCESS CALORIES WITH SERIOUS COMORBIDITY AND BODY MASS INDEX (BMI) OF 31.0 TO 31.9 IN ADULT: ICD-10-CM

## 2024-03-04 RX ORDER — TRIAMCINOLONE ACETONIDE 40 MG/ML
40 INJECTION, SUSPENSION INTRA-ARTICULAR; INTRAMUSCULAR ONCE
Status: COMPLETED | OUTPATIENT
Start: 2024-03-04 | End: 2024-03-04

## 2024-03-04 RX ADMIN — TRIAMCINOLONE ACETONIDE 40 MG: 40 INJECTION, SUSPENSION INTRA-ARTICULAR; INTRAMUSCULAR at 14:49:00

## 2024-03-04 NOTE — H&P
NURSING INTAKE COMMENTS:   Chief Complaint   Patient presents with    Ankle Pain     Consult- R ankle- 2 yrs ago- had old but no recent injury- rates pain 3-/10 on and off-  has xray in the system       HPI: This 63 year old male presents today ankle pain for many decades.  It became worse over the last 6 to 12 months without new trauma.  He broke his ankle in  and was treated nonoperatively.  He has always had some pain with the ankle but again it became worse recently.  He denies swelling.  He denies hindfoot, midfoot, or forefoot tenderness.  He denies neuropathy even though he has diabetes.    He is wife live independently in a house with stairs.  He drives.  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  He uses a cane and larger ports because of his right ankle.  He has diabetes but denies neuropathy.  His weight went down but then back up to a BMI of 31.  We talked about a dietitian to try to help him bring it back down to reduce the pain of his ankle.    Past Medical History:   Diagnosis Date    Acute hepatitis     Hep A    BPH 2018    GP diagnosed. Upcoming urologist appt on 18    BPH (benign prostatic hyperplasia)     Colon adenoma 2018    repeat colonoscopy 2023    COPD (chronic obstructive pulmonary disease) (HCC)     Coronary atherosclerosis     Decorative tattoo     Diabetes (HCC)     Disorder of thyroid     Essential hypertension 1/15/21    Exposure to hepatitis     Hep A    Fetal and  jaundice When I had HEP    Hemorrhoids 2018    1    Hepatitis     High blood pressure     High cholesterol     Lupus (HCC)     Not me but my sister has Lupus    Neuropathy     Osteoarthritis     Other and unspecified hyperlipidemia     Pneumonia due to organism     PONV (postoperative nausea and vomiting)     Pulmonary emphysema (HCC)     Visual impairment     glasses     Past Surgical History:   Procedure Laterality  Date    ANESTH,SURGERY OF SHOULDER      COLONOSCOPY  2003    COLONOSCOPY  09/19/2018    COLONOSCOPY N/A 11/20/2023    Procedure: COLONOSCOPY with polypectomy;  Surgeon: Jose Luis Anguiano MD;  Location: [facility]    OTHER SURGICAL HISTORY  1994/2012/2020    Rotator cuff, both shoulders & TCAR surgery    TONSILLECTOMY      VASECTOMY       Current Outpatient Medications   Medication Sig Dispense Refill    albuterol 108 (90 Base) MCG/ACT Inhalation Aero Soln Inhale 2 puffs into the lungs every 4 to 6 hours as needed for Wheezing or Shortness of Breath. 48 g 3    metFORMIN  MG Oral Tablet 24 Hr Take 1 tablet (500 mg total) by mouth daily with breakfast. 90 tablet 3    lisinopril 10 MG Oral Tab Take 1 tablet (10 mg total) by mouth daily. 90 tablet 3    aspirin (ASPIRIN LOW DOSE) 81 MG Oral Tab EC Take 1 tablet (81 mg total) by mouth daily. 90 tablet 3    rosuvastatin 40 MG Oral Tab Take 1 tablet (40 mg total) by mouth nightly. 90 tablet 3    levothyroxine 100 MCG Oral Tab Take 1 tablet (100 mcg total) by mouth daily. 90 tablet 3    Cholecalciferol (VITAMIN D) 50 MCG (2000 UT) Oral Cap Take by mouth.      Ascorbic Acid (VITAMIN C) 100 MG Oral Tab Take 1 tablet (100 mg total) by mouth daily.       Allergies   Allergen Reactions    Sodium SWELLING     Sodium pentathol    Pcn [Penicillins] UNKNOWN     As a child     Nitrous Oxide NAUSEA ONLY     Family History   Problem Relation Age of Onset    Heart Disorder Father     Hypertension Father     Cancer Father         Prostate Cancer -     Other (Other) Father         Alzheimer's    Dementia Father     Diabetes Father     Heart Disease Mother     Diabetes Mother     Heart Disorder Mother     Hypertension Mother     Stroke Mother     Arthritis Sister         RA    Heart Disease Sister     Other (Other) Sister         Lupus    Colon Cancer Paternal Uncle      No family Hx of DVT/PE    Social History     Occupational History    Not on file   Tobacco Use    Smoking  status: Former     Packs/day: 1.00     Years: 40.00     Additional pack years: 0.00     Total pack years: 40.00     Types: Cigarettes     Quit date: 2019     Years since quittin.2    Smokeless tobacco: Never   Vaping Use    Vaping Use: Never used   Substance and Sexual Activity    Alcohol use: Yes     Alcohol/week: 2.0 standard drinks of alcohol     Types: 2 Glasses of wine per week     Comment: Occasional glass of wine. Maybe one or two glasses a month.    Drug use: No    Sexual activity: Not on file        Review of Systems:  GENERAL: feels generally well, no significant weight loss or weight gain  SKIN: no ulcerated or worrisome skin lesions  EYES:denies blurred vision or double vision  HEENT: denies new nasal congestion, sinus pain or ST  LUNGS: denies shortness of breath  CARDIOVASCULAR: denies chest pain  GI: no hematemesis, no worsening heartburn, no diarrhea  : no dysuria, no blood in urine, no difficulty urinating, no incontinence  MUSCULOSKELETAL: no other musculoskeletal complaints other than in HPI  NEURO: no numbness or tingling, no weakness or balance disorder  PSYCHE: no depression or anxiety  HEMATOLOGIC: no hx of blood dyscrasia, no Hx DVT/PE  ENDOCRINE: no thyroid or diabetes issues  ALL/ASTHMA: no new hx of severe allergy or asthma    Physical Examination:    There were no vitals taken for this visit.  Constitutional: appears well hydrated, alert and responsive, no acute distress noted  Extremities: The right ankle externally appears normal with healthy skin on the feet and toes.  No swelling or asymmetric warmth.  Musculoskeletal: 50% motion in all directions right ankle compared to left.  He still can get neutral in terms of dorsiflexion but plantarflexion is only 20 degrees.  Decreased internal and external rotation.  Shuck test negative.  No hindfoot, midfoot, or forefoot tenderness.  Neurological: Normal motor and sensory right foot and toes.  He denies diabetic  neuropathy.    Imaging: X-rays show bone-on-bone osteoarthritis of the ankle joint.  He does not have midfoot or Charcot joint signs.      No results found.     Lab Results   Component Value Date    WBC 7.6 01/20/2024    HGB 16.0 01/20/2024    .0 01/20/2024      Lab Results   Component Value Date     (H) 01/20/2024    BUN 17 01/20/2024    CREATSERUM 1.27 01/20/2024    GFRNAA 69 02/26/2022    GFRAA 80 02/26/2022        Assessment and Plan:  Diagnoses and all orders for this visit:    Primary osteoarthritis of right ankle  -     Arthrocentesis aspiration and injection intermediate Right joint bursa w/o US  -     triamcinolone acetonide (Kenalog-40) 40 MG/ML injection 40 mg    Class 1 obesity due to excess calories with serious comorbidity and body mass index (BMI) of 31.0 to 31.9 in adult  -     DIETITIAN EDUCATION INITIAL, DIET (INTERNAL)        Assessment: Above diagnoses.    Plan: We talked about options such as cortisone injection, weight loss, and ankle replacement versus fusion.  He is not interested in surgical management.  He did accept dietitian referral for weight loss.  With significant weight loss, he will reduce the pain of his right ankle arthritis.  We talked about calorie counting and portion control.    I also recommended cortisone injection.  We discussed that his glucose levels will elevate for a few days.  If the over 300, I told him to call his PCP.  He agreed to those terms.  Aspiration was negative and he tolerated the injection of bupivacaine 0.5% 3 cc and Kenalog 1 cc well to the right ankle.    For now he will see me as needed.  If he desires another cortisone injection, the earliest he can come back would be July 2024.    Follow Up: No follow-ups on file.    Mike Kline MD

## 2024-03-04 NOTE — TELEPHONE ENCOUNTER
From: Terrance Jordan  To: Khushi Taylor Kevin  Sent: 3/2/2024 6:55 PM CST  Subject: Cardiologist Visit    Hi Doc,    So I went back to Dr. Perez to review the test results. He said I have aortic stenosis. He also said that it could be 5 years or more before I need surgery, but what confuses me after researching it post-visit is he wants to do another echocardiogram in 6 months. The other concerning thing is Medicare won't pay for it until it becomes severe. Anyway, just wanted to keep you posted since he doesn't use [facility].

## 2024-03-04 NOTE — PROGRESS NOTES
Per verbal order from Dr. Kline, draw up 3 ml of 0.5% Marcaine and 1ml of Kenalog 40 for cortisone injection to right ankle. Tara CRABTREE MA  Patient provided education handout for cortisone injection.

## 2024-03-05 ENCOUNTER — TELEPHONE (OUTPATIENT)
Dept: FAMILY MEDICINE CLINIC | Facility: CLINIC | Age: 64
End: 2024-03-05

## 2024-03-05 DIAGNOSIS — E11.9 TYPE 2 DIABETES MELLITUS WITHOUT COMPLICATION, WITHOUT LONG-TERM CURRENT USE OF INSULIN (HCC): Primary | ICD-10-CM

## 2024-03-05 NOTE — TELEPHONE ENCOUNTER
----- Message from Gemma Dunn RD sent at 3/5/2024 10:02 AM CST -----  Regarding: Dietitian order needed  Dr. Brown,    Your patient, Ej Jordan, contacted me to schedule nutrition ed for weight loss. He has an order from Dr. Kline for class 1 obesity, but Medicare will not reimburse for that dx. Would you put a Dietitian order in for Type 2 Diabetes, which I see is in his history and will be covered by Medicare? Thank you for your consideration.    Gemma Dunn RD, LDN

## 2024-03-05 NOTE — TELEPHONE ENCOUNTER
New order placed but also placed order for [facility]. Please give pt the number and information.

## 2024-03-05 NOTE — TELEPHONE ENCOUNTER
Called patient verified full name and . Informed patient of new referrals. Patient verbalized understanding and did not have any further questions.

## 2024-03-23 ENCOUNTER — HOSPITAL ENCOUNTER (OUTPATIENT)
Dept: NUTRITION | Facility: HOSPITAL | Age: 64
Discharge: HOME | End: 2024-03-23
Attending: FAMILY MEDICINE
Payer: MEDICARE

## 2024-03-23 DIAGNOSIS — E11.9 TYPE 2 DIABETES MELLITUS WITHOUT COMPLICATION, WITHOUT LONG-TERM CURRENT USE OF INSULIN (HCC): Primary | ICD-10-CM

## 2024-03-23 PROCEDURE — 97802 MEDICAL NUTRITION INDIV IN: CPT | Performed by: DIETITIAN, REGISTERED

## 2024-03-23 NOTE — PROGRESS NOTES
Nutrition Assessment    Terrance Jordan is a 63 year old male.    Referred by: Attending  Referring Physician Name: Khushi Tayloranaya Brown    Assessment     Medical Nutrition Therapy Comment: Types 2 diabetes  Visit Information: Initial Visit 3/23/24  60 minutes spent with patient    ANTHROPOMETRICS  HT: 5' 10.5\"  WT: 224 pounds  Weight Gain: Yes  IBW: 80 kg/176 lbs  127% IBW  BMI: 32  BMI CLASSIFICATION: 30-34.9 kg/m2 - obesity class I      SIGNIFICANT MEDICAL Hx  Significant Past Medical Hx: HTN, CAD, Hyperlipidemia, ossteoarthritis  Pertinent Medications:   Current Outpatient Medications:     albuterol 108 (90 Base) MCG/ACT Inhalation Aero Soln, Inhale 2 puffs into the lungs every 4 to 6 hours as needed for Wheezing or Shortness of Breath., Disp: 48 g, Rfl: 3    metFORMIN  MG Oral Tablet 24 Hr, Take 1 tablet (500 mg total) by mouth daily with breakfast., Disp: 90 tablet, Rfl: 3    lisinopril 10 MG Oral Tab, Take 1 tablet (10 mg total) by mouth daily., Disp: 90 tablet, Rfl: 3    aspirin (ASPIRIN LOW DOSE) 81 MG Oral Tab EC, Take 1 tablet (81 mg total) by mouth daily., Disp: 90 tablet, Rfl: 3    rosuvastatin 40 MG Oral Tab, Take 1 tablet (40 mg total) by mouth nightly., Disp: 90 tablet, Rfl: 3    levothyroxine 100 MCG Oral Tab, Take 1 tablet (100 mcg total) by mouth daily., Disp: 90 tablet, Rfl: 3    Cholecalciferol (VITAMIN D) 50 MCG (2000 UT) Oral Cap, Take by mouth., Disp: , Rfl:     Ascorbic Acid (VITAMIN C) 100 MG Oral Tab, Take 1 tablet (100 mg total) by mouth daily., Disp: , Rfl:   Pertinent Lab Results: 1/20/24: A1C 6.8%, LDL 53, HDL 44, , cholesterol 122    DIET HISTORY  Number of meals per day: 3  Number of snacks per day: 2  Number of meals away from home (per week):  Travels for work, eats out frequently  Comment: Met with patient and his wife to discuss a meal plan for weight loss. He has a long history of following a diet to lose weight and then once he stops diet, regains alll the weight and  more. Currently he is 224 pounds, he feels most comfortable at 175-180 pounds, which would put him in the normal BMI range. Terrance struggles with mobility due to osteoarthritis in his right ankle and knee. Walking is painful, so he is mobile for ADLs only. He is not currently exercising, but has been considering purchasing a stationary bike for exercise since this will take weight off his ankle. Current diet:  Breakfast: 1 slice toast with butter and sugar free jam, 3 eggs cooked in butter, 1 cup orange juice, coffee  Lunch: sandwich with lunch meat and cheese, baked chips (1 ounce), 1 cup whole milk with a banana blended in  Dinner: 2 cups rice or more, stewed meat, poultry or fish, occasional vegetable.  Ej is not consistent with his vegetable intake and tends to have larger portions of carbohydrates to get more volume into his meal to feel full. He is not a frequent snacker. He struggles with making healthy choices while traveling because he does not always have a choice of meal, but he can control the portion he consumes. He would like to lose weight so that walking is less painful and to manage his diabetes.    PHYSICAL ACTIVITY  Type: other: ADLs only  Physical Assessment: Recommend increase activity to 150-200 minutes per week    Nutrition Diagnosis:  Altered nutrition related lab value  due to excess intake as evidenced by A1C of 6.8%    Intervention     Nutrition Education: Recommended Modification  Nutrition/Diet Handouts Given: Weight Loss Diet Handouts:  Weight Management Packet Includes: Calorie/Carb Counting, My Plate, Portion Size Control, and Food Label Reading      NUTRITION PRESCRIPTION:      Needs:  1800 Calorie     100 gm Protein      Oral Diet Prescription: Balanced CHO 1800 Calorie    Goals     Nutrition Goals:   Increase water intake to 64 ounces or more per day. Have 16 ounces of water which each meal.  Balance carbohydrates at meals, limit to 60 grams or less per meal.  Increase vegetables  to 3 or more cups per day. Fill half of the lunch and dinner plate with vegetables.  Have 100 grams of protein per day, about 25-30 grams per meal.  Add activity, strength training with weights and possible stationary bike riding.   Stop drinking juice.  Contact RD for follow up appointment.     Recommendation to MD: none at this time    Assessment of Ability/Barriers     Patient and/or Family Will: Verbalize Understanding    Patient and/or Family Ability to Learn: Retain Information    Readiness to Learn: Motivated    Barriers to Learning: None        3/23/2024  Gemma Dunn RD

## 2024-04-19 ENCOUNTER — HOSPITAL ENCOUNTER (OUTPATIENT)
Dept: CT IMAGING | Facility: HOSPITAL | Age: 64
Discharge: HOME | End: 2024-04-19
Attending: FAMILY MEDICINE
Payer: MEDICARE

## 2024-04-19 DIAGNOSIS — Z00.00 ENCOUNTER FOR ANNUAL HEALTH EXAMINATION: ICD-10-CM

## 2024-04-19 DIAGNOSIS — Z87.891 HISTORY OF TOBACCO USE: ICD-10-CM

## 2024-04-19 PROCEDURE — 71271 CT THORAX LUNG CANCER SCR C-: CPT | Performed by: FAMILY MEDICINE

## 2024-07-15 ENCOUNTER — OFFICE VISIT (OUTPATIENT)
Dept: FAMILY MEDICINE CLINIC | Facility: CLINIC | Age: 64
End: 2024-07-15
Payer: MEDICARE

## 2024-07-15 VITALS
WEIGHT: 219.38 LBS | DIASTOLIC BLOOD PRESSURE: 68 MMHG | HEIGHT: 70 IN | HEART RATE: 93 BPM | BODY MASS INDEX: 31.41 KG/M2 | SYSTOLIC BLOOD PRESSURE: 126 MMHG

## 2024-07-15 DIAGNOSIS — I35.0 AORTIC VALVE STENOSIS, ETIOLOGY OF CARDIAC VALVE DISEASE UNSPECIFIED: ICD-10-CM

## 2024-07-15 DIAGNOSIS — E78.5 HYPERLIPIDEMIA, UNSPECIFIED HYPERLIPIDEMIA TYPE: ICD-10-CM

## 2024-07-15 DIAGNOSIS — Z00.00 ENCOUNTER FOR ANNUAL HEALTH EXAMINATION: ICD-10-CM

## 2024-07-15 DIAGNOSIS — I65.22 LEFT CAROTID STENOSIS: ICD-10-CM

## 2024-07-15 DIAGNOSIS — E11.9 TYPE 2 DIABETES MELLITUS WITHOUT COMPLICATION, WITHOUT LONG-TERM CURRENT USE OF INSULIN (HCC): Primary | ICD-10-CM

## 2024-07-15 DIAGNOSIS — E55.9 VITAMIN D DEFICIENCY: ICD-10-CM

## 2024-07-15 DIAGNOSIS — I25.10 CORONARY ARTERY DISEASE INVOLVING NATIVE CORONARY ARTERY OF NATIVE HEART WITHOUT ANGINA PECTORIS: ICD-10-CM

## 2024-07-15 LAB — HEMOGLOBIN A1C: 6.7 % (ref 4.3–5.6)

## 2024-07-15 NOTE — PROGRESS NOTES
Terrance Jordan is a 63 year old male.   Chief Complaint   Patient presents with    Follow - Up     6 months     HPI:   Traveling for work a lot so unable to get on good eating schedule. Did see dietician and reviewed better food choices - smaller amounts.   Did see ortho for chronic ankle pain. Received injection but did not improve pain.   Seeing cardiologist now for aortic stenosis but mild.   Had severe hip pain for few days but improved.   2 years with skin lesion at base of butt - not painful. Has not changed.   Current Outpatient Medications on File Prior to Visit   Medication Sig Dispense Refill    albuterol 108 (90 Base) MCG/ACT Inhalation Aero Soln Inhale 2 puffs into the lungs every 4 to 6 hours as needed for Wheezing or Shortness of Breath. 48 g 3    metFORMIN  MG Oral Tablet 24 Hr Take 1 tablet (500 mg total) by mouth daily with breakfast. 90 tablet 3    lisinopril 10 MG Oral Tab Take 1 tablet (10 mg total) by mouth daily. 90 tablet 3    aspirin (ASPIRIN LOW DOSE) 81 MG Oral Tab EC Take 1 tablet (81 mg total) by mouth daily. 90 tablet 3    rosuvastatin 40 MG Oral Tab Take 1 tablet (40 mg total) by mouth nightly. 90 tablet 3    levothyroxine 100 MCG Oral Tab Take 1 tablet (100 mcg total) by mouth daily. 90 tablet 3    Cholecalciferol (VITAMIN D) 50 MCG (2000 UT) Oral Cap Take by mouth.      Ascorbic Acid (VITAMIN C) 100 MG Oral Tab Take 1 tablet (100 mg total) by mouth daily.       No current facility-administered medications on file prior to visit.      Past Medical History:    Acute hepatitis    Hep A    BPH    GP diagnosed. Upcoming urologist appt on 18    BPH (benign prostatic hyperplasia)    Colon adenoma    repeat colonoscopy 2023    COPD (chronic obstructive pulmonary disease) (HCC)    Coronary atherosclerosis    Decorative tattoo    Diabetes (HCC)    Disorder of thyroid    Essential hypertension    Exposure to hepatitis    Hep A    Fetal and  jaundice    Hemorrhoids    1     Hepatitis    High blood pressure    High cholesterol    Lupus (HCC)    Not me but my sister has Lupus    Neuropathy    Osteoarthritis    Other and unspecified hyperlipidemia    Pneumonia due to organism    PONV (postoperative nausea and vomiting)    Pulmonary emphysema (HCC)    Visual impairment    glasses      Social History:  Social History     Socioeconomic History    Marital status:    Tobacco Use    Smoking status: Former     Current packs/day: 0.00     Average packs/day: 1 pack/day for 40.0 years (40.0 ttl pk-yrs)     Types: Cigarettes     Start date: 1979     Quit date: 2019     Years since quittin.6    Smokeless tobacco: Never   Vaping Use    Vaping status: Never Used   Substance and Sexual Activity    Alcohol use: Yes     Alcohol/week: 2.0 standard drinks of alcohol     Types: 2 Glasses of wine per week     Comment: Occasional glass of wine. Maybe one or two glasses a month.    Drug use: No        REVIEW OF SYSTEMS:   Review of Systems   See HPI     EXAM:   /68   Pulse 93   Ht 5' 10\" (1.778 m)   Wt 219 lb 6.4 oz (99.5 kg)   BMI 31.48 kg/m²   GENERAL: well developed, well nourished,in no apparent distress  SKIN: right buttock - at base near scrotum 5 mm firm subdermal lesion.     LUNGS: clear to auscultation  CARDIO: RRR without murmur      ASSESSMENT AND PLAN:   1. Type 2 diabetes mellitus without complication, without long-term current use of insulin (HCC)  Stable. Continue current medications.   - POC Glycohemoglobin [37779]  - CBC With Differential With Platelet; Future  - Comp Metabolic Panel (14); Future  - Hemoglobin A1C; Future  - Lipid Panel; Future  - Microalb/Creat Ratio, Random Urine; Future  - Vitamin B12; Future  - Vitamin D; Future  - TSH W Reflex To Free T4; Future    2. Vitamin D deficiency    - Vitamin B12; Future  - Vitamin D; Future    3. Hyperlipidemia, unspecified hyperlipidemia type      4. Coronary artery disease involving native coronary artery of  native heart without angina pectoris      5. Left carotid stenosis      6. Aortic valve stenosis, etiology of cardiac valve disease unspecified    - CBC With Differential With Platelet; Future  - Comp Metabolic Panel (14); Future  - Hemoglobin A1C; Future  - Lipid Panel; Future  - Microalb/Creat Ratio, Random Urine; Future  - Vitamin B12; Future  - Vitamin D; Future  - TSH W Reflex To Free T4; Future          The patient indicates understanding of these issues and agrees to the plan.      Khushi Brown MD  7/15/2024  3:17 PM

## 2024-09-13 NOTE — TELEPHONE ENCOUNTER
Called the patient at [phone]   - Informed the patient of Clementine Lau Patient called requesting a referral to see urology to have prostate and testosterone levels checked.      Please sign off on referral request.     Thank you, Lyle

## 2024-11-25 ENCOUNTER — PATIENT MESSAGE (OUTPATIENT)
Dept: FAMILY MEDICINE CLINIC | Facility: CLINIC | Age: 64
End: 2024-11-25

## 2024-11-26 RX ORDER — LISINOPRIL 10 MG/1
10 TABLET ORAL DAILY
Qty: 90 TABLET | Refills: 3 | Status: SHIPPED | OUTPATIENT
Start: 2024-11-26

## 2024-11-26 RX ORDER — METFORMIN HYDROCHLORIDE 500 MG/1
500 TABLET, EXTENDED RELEASE ORAL
Qty: 90 TABLET | Refills: 3 | Status: SHIPPED | OUTPATIENT
Start: 2024-11-26

## 2024-11-26 RX ORDER — LEVOTHYROXINE SODIUM 100 UG/1
100 TABLET ORAL DAILY
Qty: 90 TABLET | Refills: 3 | Status: CANCELLED | OUTPATIENT
Start: 2024-11-26

## 2024-11-26 RX ORDER — ROSUVASTATIN CALCIUM 40 MG/1
40 TABLET, COATED ORAL NIGHTLY
Qty: 90 TABLET | Refills: 3 | Status: SHIPPED | OUTPATIENT
Start: 2024-11-26

## 2024-11-26 NOTE — TELEPHONE ENCOUNTER
Please review;     Please see message below for upcoming appointment.    Future Appointments   Date Time Provider Department Center   1/18/2025 10:15 AM Khushi Brown MD [facility]       Requested Prescriptions   Pending Prescriptions Disp Refills    levothyroxine 100 MCG Oral Tab 90 tablet 3     Sig: Take 1 tablet (100 mcg total) by mouth daily.       There is no refill protocol information for this order      Signed Prescriptions Disp Refills    metFORMIN  MG Oral Tablet 24 Hr 90 tablet 3     Sig: Take 1 tablet (500 mg total) by mouth daily with breakfast.       Diabetes Medication Protocol Passed - 11/26/2024  4:26 PM        Passed - Last A1C < 7.5 and within past 6 months     Lab Results   Component Value Date    A1C 6.7 (A) 07/15/2024             Passed - In person appointment or virtual visit in the past 6 mos or appointment in next 3 mos     Recent Outpatient Visits              4 months ago Type 2 diabetes mellitus without complication, without long-term current use of insulin (HCC)    [facility] Khushi Brown MD    Office Visit    8 months ago Primary osteoarthritis of right ankle    [facility]    Office Visit    10 months ago Vitamin D deficiency    [facility] Khushi Brown MD    Office Visit    1 year ago Need for vaccination    [facility]    Nurse Only    1 year ago Well adult exam    [facility] Clari Berman APRN    Office Visit          Future Appointments         Provider Department Appt Notes    In 1 month Khushi Brown MD [facility]                    Passed - Microalbumin procedure in past 12 months or taking ACE/ARB        Passed - EGFRCR or GFRNAA > 50     GFR Evaluation  EGFRCR: 63 , resulted on  1/20/2024          Passed - GFR in the past 12 months          lisinopril 10 MG Oral Tab 90 tablet 3     Sig: Take 1 tablet (10 mg total) by mouth daily.       Hypertension Medications Protocol Passed - 11/26/2024  4:26 PM        Passed - CMP or BMP in past 12 months        Passed - Last BP reading less than 140/90     BP Readings from Last 1 Encounters:   07/15/24 126/68               Passed - In person appointment or virtual visit in the past 12 mos or appointment in next 3 mos     Recent Outpatient Visits              4 months ago Type 2 diabetes mellitus without complication, without long-term current use of insulin (HCC)    [facility] Khushi Brown MD    Office Visit    8 months ago [facility] osteoarthritis of right ankle    [facility]    Office Visit    10 months ago Vitamin D deficiency    [facility] Khushi Brown MD    Office Visit    1 year ago Need for vaccination    [facility]    Nurse Only    1 year ago Well adult exam    [facility] Clari Berman APRN    Office Visit          Future Appointments         Provider Department Appt Notes    In 1 month Khushi Brown MD [facility]                    Passed - EGFRCR or GFRNAA > 50     GFR Evaluation  EGFRCR: 63 , resulted on 1/20/2024            rosuvastatin 40 MG Oral Tab 90 tablet 3     Sig: Take 1 tablet (40 mg total) by mouth nightly.       Cholesterol Medication Protocol Passed - 11/26/2024  4:26 PM        Passed - ALT < 80     Lab Results   Component Value Date    ALT 32 01/20/2024             Passed - ALT resulted within past year        Passed - Lipid panel within past 12 months     Lab Results   Component Value Date    CHOLEST 122 01/20/2024    TRIG 144 01/20/2024     HDL 44 01/20/2024    LDL 53 01/20/2024    VLDL 21 01/20/2024    NONHDLC 78 01/20/2024             Passed - In person appointment or virtual visit in the past 12 mos or appointment in next 3 mos     Recent Outpatient Visits              4 months ago Type 2 diabetes mellitus without complication, without long-term current use of insulin ([facility])    [facility] Khushi Brown MD    Office Visit    8 months ago Primary osteoarthritis of right ankle    [facility] Mike Owens MD    Office Visit    10 months ago Vitamin D deficiency    [facility] Khushi Brown MD    Office Visit    1 year ago Need for vaccination    [facility]    Nurse Only    1 year ago Well adult exam    [facility] [place] Clari Berman, APRN    Office Visit          Future Appointments         Provider Department Appt Notes    In 1 month Khushi Brown MD [facility]                       Recent Outpatient Visits              4 months ago Type 2 diabetes mellitus without complication, without long-term current use of insulin ([facility])    [facility]    [facility]    8 months ago Primary osteoarthritis of right ankle    [facility], Mike Owens MD    Office Visit    10 months ago Vitamin D deficiency    [facility] BrownKhushi MD    Office Visit    1 year ago Need for vaccination    [facility]    Nurse Only    1 year ago Well adult exam    [facility] Clari Berman, APRN    Office Visit          Future Appointments         Provider Department Appt Notes    In 1 month Kevin  Khushi Vazquez MD [facility]

## 2024-11-26 NOTE — TELEPHONE ENCOUNTER
Refill passed per [facility] protocol.  Requested Prescriptions   Pending Prescriptions Disp Refills    METFORMIN  MG Oral Tablet 24 Hr [Pharmacy Med Name: METFORMIN HCL ER TABS 500MG] 90 tablet 3     Sig: TAKE 1 TABLET DAILY WITH   BREAKFAST       Diabetes Medication Protocol Passed - 11/26/2024  4:26 PM        Passed - Last A1C < 7.5 and within past 6 months     Lab Results   Component Value Date    A1C 6.7 (A) 07/15/2024             Passed - In person appointment or virtual visit in the past 6 mos or appointment in next 3 mos     Recent Outpatient Visits              4 months ago Type 2 diabetes mellitus without complication, without long-term current use of insulin (HCC)    [facility] [place] Khushi Brown MD    Office Visit    8 months ago Primary osteoarthritis of right ankle    [facility]    Office Visit    10 months ago Vitamin D deficiency    [facility] Khushi Brown MD    Office Visit    1 year ago Need for vaccination    [facility]    Nurse Only    1 year ago Well adult exam    [facility] Clari Berman APRN    Office Visit          Future Appointments         Provider Department Appt Notes    In 1 month Khushi Brown MD [facility]                    Passed - Microalbumin procedure in past 12 months or taking ACE/ARB        Passed - EGFRCR or GFRNAA > 50     GFR Evaluation  EGFRCR: 63 , resulted on 1/20/2024          Passed - GFR in the past 12 months          LISINOPRIL 10 MG Oral Tab [Pharmacy Med Name: LISINOPRIL TABS 10MG] 90 tablet 3     Sig: TAKE 1 TABLET DAILY       Hypertension Medications Protocol Passed - 11/26/2024  4:26 PM        Passed - CMP or BMP in past 12 months        Passed - Last BP reading less  than 140/90     BP Readings from Last 1 Encounters:   07/15/24 126/68               Passed - In person appointment or virtual visit in the past 12 mos or appointment in next 3 mos     Recent Outpatient Visits              4 months ago Type 2 diabetes mellitus without complication, without long-term current use of insulin (HCC)    [facility] Khushi Brown MD    Office Visit    8 months ago Primary osteoarthritis of right ankle    [facility], Mike Owens MD    Office Visit    10 months ago Vitamin D deficiency    [facility] Khushi Brown MD    Office Visit    1 year ago Need for vaccination    [facility]    Nurse Only    1 year ago Well adult exam    [facility] Clari Berman APRN    Office Visit          Future Appointments         Provider Department Appt Notes    In 1 month Khushi Brown MD [facility]                    Passed - EGFRCR or GFRNAA > 50     GFR Evaluation  EGFRCR: 63 , resulted on 1/20/2024            ROSUVASTATIN 40 MG Oral Tab [Pharmacy Med Name: ROSUVASTATIN TABS 40MG] 90 tablet 3     Sig: TAKE 1 TABLET NIGHTLY       Cholesterol Medication Protocol Passed - 11/26/2024  4:26 PM        Passed - ALT < 80     Lab Results   Component Value Date    ALT 32 01/20/2024             Passed - ALT resulted within past year        Passed - Lipid panel within past 12 months     Lab Results   Component Value Date    CHOLEST 122 01/20/2024    TRIG 144 01/20/2024    HDL 44 01/20/2024    LDL 53 01/20/2024    VLDL 21 01/20/2024    NONHDLC 78 01/20/2024             Passed - In person appointment or virtual visit in the past 12 mos or appointment in next 3 mos     Recent Outpatient Visits              4 months ago Type 2 diabetes mellitus without  complication, without long-term current use of insulin ([facility])    [facility] [address], Khushi Camarena MD    Office Visit    8 months ago Primary osteoarthritis of right ankle    [facility], Mike Owens MD    Office Visit    10 months ago Vitamin D deficiency    [facility]    Office Visit    1 year ago Need for vaccination    [facility] [place]    Nurse Only    1 year ago Well adult exam    [facility] [place] Clari Berman, APRN    Office Visit          Future Appointments         Provider Department Appt Notes    In 1 month Khushi Brown MD [facility]                      levothyroxine 100 MCG Oral Tab 90 tablet 3     Sig: Take 1 tablet (100 mcg total) by mouth daily.       There is no refill protocol information for this order        Recent Outpatient Visits              4 months ago Type 2 diabetes mellitus without complication, without long-term current use of insulin ([facility])    [facility]    Office Visit    8 months ago Primary osteoarthritis of right ankle    [facility], Mike Owens MD    Office Visit    10 months ago Vitamin D deficiency    [facility] Khushi Brown MD    Office Visit    1 year ago Need for vaccination    [facility] [place]    Nurse Only    1 year ago Well adult exam    [facility]    [facility] Visit          Future Appointments         Provider Department Appt Notes    In 1 month Khushi Brown MD [facility]

## 2025-01-18 ENCOUNTER — LAB ENCOUNTER (OUTPATIENT)
Dept: LAB | Age: 65
End: 2025-01-18
Attending: FAMILY MEDICINE
Payer: MEDICARE

## 2025-01-18 ENCOUNTER — OFFICE VISIT (OUTPATIENT)
Dept: FAMILY MEDICINE CLINIC | Facility: CLINIC | Age: 65
End: 2025-01-18
Payer: MEDICARE

## 2025-01-18 VITALS
WEIGHT: 210.81 LBS | HEART RATE: 67 BPM | DIASTOLIC BLOOD PRESSURE: 68 MMHG | SYSTOLIC BLOOD PRESSURE: 119 MMHG | BODY MASS INDEX: 30.18 KG/M2 | HEIGHT: 70 IN

## 2025-01-18 DIAGNOSIS — Z00.00 ENCOUNTER FOR ANNUAL HEALTH EXAMINATION: ICD-10-CM

## 2025-01-18 DIAGNOSIS — E78.2 MIXED HYPERLIPIDEMIA: ICD-10-CM

## 2025-01-18 DIAGNOSIS — J43.2 CENTRILOBULAR EMPHYSEMA (HCC): ICD-10-CM

## 2025-01-18 DIAGNOSIS — Z12.5 SCREENING PSA (PROSTATE SPECIFIC ANTIGEN): ICD-10-CM

## 2025-01-18 DIAGNOSIS — Z87.891 HISTORY OF TOBACCO USE: Primary | ICD-10-CM

## 2025-01-18 DIAGNOSIS — E11.9 TYPE 2 DIABETES MELLITUS WITHOUT COMPLICATION, WITHOUT LONG-TERM CURRENT USE OF INSULIN (HCC): ICD-10-CM

## 2025-01-18 DIAGNOSIS — I35.0 AORTIC VALVE STENOSIS, ETIOLOGY OF CARDIAC VALVE DISEASE UNSPECIFIED: ICD-10-CM

## 2025-01-18 DIAGNOSIS — I25.10 CORONARY ARTERY DISEASE INVOLVING NATIVE CORONARY ARTERY OF NATIVE HEART WITHOUT ANGINA PECTORIS: ICD-10-CM

## 2025-01-18 DIAGNOSIS — E55.9 VITAMIN D DEFICIENCY: ICD-10-CM

## 2025-01-18 DIAGNOSIS — I65.22 LEFT CAROTID STENOSIS: ICD-10-CM

## 2025-01-18 LAB
ALBUMIN SERPL-MCNC: 5 G/DL (ref 3.2–4.8)
ALBUMIN/GLOB SERPL: 1.7 {RATIO} (ref 1–2)
ALP LIVER SERPL-CCNC: 63 U/L
ALT SERPL-CCNC: 27 U/L
ANION GAP SERPL CALC-SCNC: 8 MMOL/L (ref 0–18)
AST SERPL-CCNC: 28 U/L (ref ?–34)
BASOPHILS # BLD AUTO: 0.08 X10(3) UL (ref 0–0.2)
BASOPHILS NFR BLD AUTO: 0.8 %
BILIRUB SERPL-MCNC: 0.6 MG/DL (ref 0.2–1.1)
BUN BLD-MCNC: 15 MG/DL (ref 9–23)
BUN/CREAT SERPL: 11.4 (ref 10–20)
CALCIUM BLD-MCNC: 10.7 MG/DL (ref 8.7–10.4)
CHLORIDE SERPL-SCNC: 109 MMOL/L (ref 98–112)
CHOLEST SERPL-MCNC: 135 MG/DL (ref ?–200)
CO2 SERPL-SCNC: 27 MMOL/L (ref 21–32)
COMPLEXED PSA SERPL-MCNC: 1.01 NG/ML (ref ?–4)
CREAT BLD-MCNC: 1.32 MG/DL
CREAT UR-SCNC: 112.6 MG/DL
DEPRECATED RDW RBC AUTO: 46.8 FL (ref 35.1–46.3)
EGFRCR SERPLBLD CKD-EPI 2021: 59 ML/MIN/1.73M2 (ref 60–?)
EOSINOPHIL # BLD AUTO: 0.12 X10(3) UL (ref 0–0.7)
EOSINOPHIL NFR BLD AUTO: 1.3 %
ERYTHROCYTE [DISTWIDTH] IN BLOOD BY AUTOMATED COUNT: 13.5 % (ref 11–15)
EST. AVERAGE GLUCOSE BLD GHB EST-MCNC: 131 MG/DL (ref 68–126)
FASTING PATIENT LIPID ANSWER: YES
FASTING STATUS PATIENT QL REPORTED: YES
GLOBULIN PLAS-MCNC: 3 G/DL (ref 2–3.5)
GLUCOSE BLD-MCNC: 115 MG/DL (ref 70–99)
HBA1C MFR BLD: 6.2 % (ref ?–5.7)
HCT VFR BLD AUTO: 47.5 %
HDLC SERPL-MCNC: 49 MG/DL (ref 40–59)
HGB BLD-MCNC: 16 G/DL
IMM GRANULOCYTES # BLD AUTO: 0.03 X10(3) UL (ref 0–1)
IMM GRANULOCYTES NFR BLD: 0.3 %
LDLC SERPL CALC-MCNC: 61 MG/DL (ref ?–100)
LYMPHOCYTES # BLD AUTO: 2.2 X10(3) UL (ref 1–4)
LYMPHOCYTES NFR BLD AUTO: 23.3 %
MCH RBC QN AUTO: 31.7 PG (ref 26–34)
MCHC RBC AUTO-ENTMCNC: 33.7 G/DL (ref 31–37)
MCV RBC AUTO: 94.1 FL
MICROALBUMIN UR-MCNC: 12.3 MG/DL
MICROALBUMIN/CREAT 24H UR-RTO: 109.2 UG/MG (ref ?–30)
MONOCYTES # BLD AUTO: 0.74 X10(3) UL (ref 0.1–1)
MONOCYTES NFR BLD AUTO: 7.8 %
NEUTROPHILS # BLD AUTO: 6.27 X10 (3) UL (ref 1.5–7.7)
NEUTROPHILS # BLD AUTO: 6.27 X10(3) UL (ref 1.5–7.7)
NEUTROPHILS NFR BLD AUTO: 66.5 %
NONHDLC SERPL-MCNC: 86 MG/DL (ref ?–130)
OSMOLALITY SERPL CALC.SUM OF ELEC: 300 MOSM/KG (ref 275–295)
PLATELET # BLD AUTO: 206 10(3)UL (ref 150–450)
POTASSIUM SERPL-SCNC: 4.7 MMOL/L (ref 3.5–5.1)
PROT SERPL-MCNC: 8 G/DL (ref 5.7–8.2)
RBC # BLD AUTO: 5.05 X10(6)UL
SODIUM SERPL-SCNC: 144 MMOL/L (ref 136–145)
T4 FREE SERPL-MCNC: 1.7 NG/DL (ref 0.8–1.7)
TRIGL SERPL-MCNC: 148 MG/DL (ref 30–149)
TSI SER-ACNC: 5.84 UIU/ML (ref 0.55–4.78)
VIT B12 SERPL-MCNC: 670 PG/ML (ref 211–911)
VIT D+METAB SERPL-MCNC: 47.5 NG/ML (ref 30–100)
VLDLC SERPL CALC-MCNC: 22 MG/DL (ref 0–30)
WBC # BLD AUTO: 9.4 X10(3) UL (ref 4–11)

## 2025-01-18 PROCEDURE — G0008 ADMIN INFLUENZA VIRUS VAC: HCPCS | Performed by: FAMILY MEDICINE

## 2025-01-18 PROCEDURE — 82306 VITAMIN D 25 HYDROXY: CPT

## 2025-01-18 PROCEDURE — 80061 LIPID PANEL: CPT

## 2025-01-18 PROCEDURE — 82043 UR ALBUMIN QUANTITATIVE: CPT

## 2025-01-18 PROCEDURE — 83036 HEMOGLOBIN GLYCOSYLATED A1C: CPT

## 2025-01-18 PROCEDURE — G0438 PPPS, INITIAL VISIT: HCPCS | Performed by: FAMILY MEDICINE

## 2025-01-18 PROCEDURE — 90662 IIV NO PRSV INCREASED AG IM: CPT | Performed by: FAMILY MEDICINE

## 2025-01-18 PROCEDURE — 84439 ASSAY OF FREE THYROXINE: CPT

## 2025-01-18 PROCEDURE — 82607 VITAMIN B-12: CPT

## 2025-01-18 PROCEDURE — 85025 COMPLETE CBC W/AUTO DIFF WBC: CPT

## 2025-01-18 PROCEDURE — 84443 ASSAY THYROID STIM HORMONE: CPT

## 2025-01-18 PROCEDURE — 80053 COMPREHEN METABOLIC PANEL: CPT

## 2025-01-18 PROCEDURE — 36415 COLL VENOUS BLD VENIPUNCTURE: CPT

## 2025-01-18 PROCEDURE — 82570 ASSAY OF URINE CREATININE: CPT

## 2025-01-18 NOTE — PROGRESS NOTES
Subjective:   Terrance Jordan is a 64 year old male who presents for a Medicare Subsequent Annual Wellness visit (Pt already had Initial Annual Wellness) and scheduled follow up of multiple significant but stable problems.   Reports home weight was 205. Trying to eat healthy again since November - eating more low carb diet. Seeing cards once a year - needs to call them for US of carotids.     History/Other:   Fall Risk Assessment:   He has been screened for Falls and is low risk.      Cognitive Assessment:   He had a completely normal cognitive assessment - see flowsheet entries     Functional Ability/Status:   Terrance Jordan has some abnormal functions as listed below:  He has Hearing problems based on screening of functional status.He has Vision problems based on screening of functional status. He has Walking problems based on screening of functional status.       Depression Screening (PHQ):  PHQ-2 SCORE: 0  , done 1/18/2025            Advanced Directives:   He does NOT have a Living Will. [Do you have a living will?: (Patient-Rptd) No]  He does NOT have a Power of  for Health Care. [Do you have a healthcare power of ?: (Patient-Rptd) No]  Discussed Advance Care Planning with patient (and family/surrogate if present). Standard forms made available to patient in After Visit Summary.      Patient Active Problem List   Diagnosis    Coronary artery disease involving native coronary artery of native heart without angina pectoris    Left carotid stenosis    Hyperlipidemia    Type 2 diabetes mellitus without complication, without long-term current use of insulin ([facility])    History of tobacco use    Aortic valve stenosis     Allergies:  He is allergic to sodium, pcn [penicillins], and nitrous oxide.    Current Medications:  Outpatient Medications Marked as Taking for the 1/18/25 encounter (Office Visit) with Khushi Brown MD   Medication Sig    levothyroxine 100 MCG Oral Tab Take 1 tablet (100 mcg total)  by mouth daily.    metFORMIN  MG Oral Tablet 24 Hr Take 1 tablet (500 mg total) by mouth daily with breakfast.    lisinopril 10 MG Oral Tab Take 1 tablet (10 mg total) by mouth daily.    rosuvastatin 40 MG Oral Tab Take 1 tablet (40 mg total) by mouth nightly.    albuterol 108 (90 Base) MCG/ACT Inhalation Aero Soln Inhale 2 puffs into the lungs every 4 to 6 hours as needed for Wheezing or Shortness of Breath.    aspirin (ASPIRIN LOW DOSE) 81 MG Oral Tab EC Take 1 tablet (81 mg total) by mouth daily.    Cholecalciferol (VITAMIN D) 50 MCG (2000 UT) Oral Cap Take by mouth.    Ascorbic Acid (VITAMIN C) 100 MG Oral Tab Take 1 tablet (100 mg total) by mouth daily.       Medical History:  He  has a past medical history of Acute hepatitis (), BPH (2018), BPH (benign prostatic hyperplasia), Colon adenoma (2018), COPD (chronic obstructive pulmonary disease) (HCC), Coronary atherosclerosis, Decorative tattoo, Diabetes (), Disorder of thyroid, Essential hypertension (1/15/21), Exposure to hepatitis (), Fetal and  jaundice (When I had HEP), Hemorrhoids (2018), Hepatitis, High blood pressure, High cholesterol, Lupus, Neuropathy, Osteoarthritis, Other and unspecified hyperlipidemia, Pneumonia due to organism, PONV (postoperative nausea and vomiting), Pulmonary emphysema (HCC), and Visual impairment.  Surgical History:  He  has a past surgical history that includes vasectomy; tonsillectomy; anesth,surgery of shoulder; colonoscopy (); colonoscopy (2018); colonoscopy (N/A, 2023); and other surgical history ().   Family History:  His family history includes Arthritis in his sister; Cancer in his father; Colon Cancer in his paternal uncle; Dementia in his father; Diabetes in his father and mother; Heart Disease in his mother and sister; Heart Disorder in his father and mother; Hypertension in his father and mother; Other in his father and sister; Stroke in his  mother.  Social History:  He  reports that he quit smoking about 5 years ago. His smoking use included cigarettes. He started smoking about 45 years ago. He has a 40 pack-year smoking history. He has never used smokeless tobacco. He reports current alcohol use of about 2.0 standard drinks of alcohol per week. He reports that he does not use drugs.    Tobacco:  He smoked tobacco in the past but quit greater than 12 months ago.  Social History     Tobacco Use   Smoking Status Former    Current packs/day: 0.00    Average packs/day: 1 pack/day for 40.0 years (40.0 ttl pk-yrs)    Types: Cigarettes    Start date: 1979    Quit date: 2019    Years since quittin.1   Smokeless Tobacco Never        CAGE Alcohol Screen:   CAGE screening score of 0 on 2025, showing low risk of alcohol abuse.      Patient Care Team:  Khushi Brown MD as PCP - General  Juan Carlos Jordan MD (OTOLARYNGOLOGY)    Review of Systems     Negative     Objective:   Physical Exam  Constitutional:       Appearance: He is well-developed.   HENT:      Right Ear: Tympanic membrane normal.      Left Ear: Tympanic membrane normal.   Eyes:      Conjunctiva/sclera: Conjunctivae normal.   Cardiovascular:      Rate and Rhythm: Normal rate and regular rhythm.      Heart sounds: Normal heart sounds.   Pulmonary:      Effort: Pulmonary effort is normal.      Breath sounds: Normal breath sounds.   Abdominal:      General: Bowel sounds are normal.      Palpations: Abdomen is soft.   Skin:     General: Skin is warm and dry.   Neurological:      Mental Status: He is alert and oriented to person, place, and time.   Psychiatric:         Behavior: Behavior normal.     Bilateral barefoot skin diabetic exam is normal, visualized feet and the appearance is normal.  Bilateral monofilament/sensation of both feet is normal.  Pulsation pedal pulse exam of both lower legs/feet is normal as well.       /68   Pulse 67   Ht 5' 10\" (1.778 m)   Wt 210 lb 12.8  oz (95.6 kg)   BMI 30.25 kg/m²  Estimated body mass index is 30.25 kg/m² as calculated from the following:    Height as of this encounter: 5' 10\" (1.778 m).    Weight as of this encounter: 210 lb 12.8 oz (95.6 kg).    Medicare Hearing Assessment:   Hearing Screening    Time taken: 1/18/2025 10:14 AM  Screening Method: Questionnaire  I have a problem hearing over the telephone: Sometimes I have trouble following the conversations when two or more people are talking at the same time: No   I have trouble understanding things on the TV: Sometimes I have to strain to understand conversations: No   I have to worry about missing the telephone ring or doorbell: No I have trouble hearing conversations in a noisy background such as a crowded room or restaurant: Sometimes   I get confused about where sounds come from: No I misunderstand some words in a sentence and need to ask people to repeat themselves: No   I especially have trouble understanding the speech of women and children: No I have trouble understanding the speaker in a large room such as at a meeting or place of Anab[employer]ist: Sometimes   Many people I talk to seem to mumble (or don't speak clearly): Sometimes People get annoyed because I misunderstand what they say: No   I misunderstand what others are saying and make inappropriate responses: No I avoid social activities because I cannot hear well and fear I will reply improperly: No   Family members and friends have told me they think I may have hearing loss: No             Visual Acuity:   Right Eye Visual Acuity: Corrected Right Eye Chart Acuity: 20/40   Left Eye Visual Acuity: Corrected Left Eye Chart Acuity: 20/40   Both Eyes Visual Acuity: Corrected Both Eyes Chart Acuity: 20/40            Assessment & Plan:   Terrance Jordan is a 64 year old male who presents for a Medicare Assessment.     1. History of tobacco use  Continue yearly lung screen     2. Aortic valve stenosis, etiology of cardiac valve disease  unspecified  Stable. Per cards     3. Type 2 diabetes mellitus without complication, without long-term current use of insulin (HCC)  Has been stable   - Ophthalmology Referral - In Network    4. Coronary artery disease involving native coronary artery of native heart without angina pectoris  Continue with cards     5. Mixed hyperlipidemia  Continue statin     6. Left carotid stenosis  Has stent. Cards is following     7. Screening PSA (prostate specific antigen)    - PSA Total, Screen; Future    8. Encounter for annual health examination      The patient indicates understanding of these issues and agrees to the plan.  Reinforced healthy diet, lifestyle, and exercise.      No follow-ups on file.     Khushi Brown MD, 1/18/2025     Supplementary Documentation:   General Health:  In the past six months, have you lost more than 10 pounds without trying?: (Patient-Rptd) 2 - No  Has your appetite been poor?: (Patient-Rptd) No  Type of Diet: (Patient-Rptd) Low Carb  How does the patient maintain a good energy level?: (Patient-Rptd) Other  How would you describe your daily physical activity?: (Patient-Rptd) Light  How would you describe your current health state?: (Patient-Rptd) Fair  How do you maintain positive mental well-being?: (Patient-Rptd) Visiting Family  On a scale of 0 to 10, with 0 being no pain and 10 being severe pain, what is your pain level?: (Patient-Rptd) 4 - (Moderate)  In the past six months, have you experienced urine leakage?: (Patient-Rptd) 0-No  At any time do you feel concerned for the safety/well-being of yourself and/or your children, in your home or elsewhere?: (Patient-Rptd) No  Have you had any immunizations at another office such as Influenza, Hepatitis B, Tetanus, or Pneumococcal?: (Patient-Rptd) No    Health Maintenance   Topic Date Due    Diabetes Care Dilated Eye Exam  08/04/2024    COVID-19 Vaccine (7 - 2024-25 season) 09/01/2024    Influenza Vaccine (1) 10/01/2024    PSA  12/17/2024     Annual Depression Screening  01/01/2025    Diabetes Care: Foot Exam (Annual)  01/01/2025    Diabetes Care: Microalb/Creat Ratio (Annual)  01/01/2025    Diabetes Care A1C  01/15/2025    Annual Physical  01/20/2025    Diabetes Care: GFR  01/20/2025    Lung Cancer Screening  04/19/2025    Colorectal Cancer Screening  11/20/2030    Fall Risk Screening (Annual)  Completed    Pneumococcal Vaccine: 50+ Years  Completed    Zoster Vaccines  Completed    Meningococcal B Vaccine  Aged Out

## 2025-01-21 ENCOUNTER — PATIENT MESSAGE (OUTPATIENT)
Dept: FAMILY MEDICINE CLINIC | Facility: CLINIC | Age: 65
End: 2025-01-21

## 2025-01-22 RX ORDER — DAPAGLIFLOZIN 10 MG/1
10 TABLET, FILM COATED ORAL DAILY
Qty: 90 TABLET | Refills: 4 | Status: SHIPPED | OUTPATIENT
Start: 2025-01-22 | End: 2025-01-23

## 2025-01-22 NOTE — TELEPHONE ENCOUNTER
Please see patient E-mail and advise.      A1C improved - good job. Keep up the healthier diet. Mild decrease in kidney function - remember to stay well hydrated and avoid ibuprofen products. I want you to consider starting a different medication for your diabetes called Farxiga. It is protective for your kidneys also. You will urinate more with it. Let me know. - Dr. Brown   Written by Khushi Brown MD on 1/21/2025 12:27 PM CST  Seen by patient Terrance Jordan on 1/21/2025 12:29 PM

## 2025-01-23 RX ORDER — DAPAGLIFLOZIN 10 MG/1
10 TABLET, FILM COATED ORAL DAILY
Qty: 30 TABLET | Refills: 0 | Status: SHIPPED | OUTPATIENT
Start: 2025-01-23

## 2025-02-28 ENCOUNTER — ORDER TRANSCRIPTION (OUTPATIENT)
Dept: ADMINISTRATIVE | Facility: HOSPITAL | Age: 65
End: 2025-02-28

## 2025-02-28 DIAGNOSIS — I25.10 CORONARY ARTERY DISEASE: Primary | ICD-10-CM

## 2025-03-08 ENCOUNTER — PATIENT MESSAGE (OUTPATIENT)
Dept: FAMILY MEDICINE CLINIC | Facility: CLINIC | Age: 65
End: 2025-03-08

## 2025-03-10 RX ORDER — DAPAGLIFLOZIN 10 MG/1
10 TABLET, FILM COATED ORAL DAILY
Qty: 90 TABLET | Refills: 4 | Status: SHIPPED | OUTPATIENT
Start: 2025-03-10

## 2025-03-10 NOTE — TELEPHONE ENCOUNTER
I printed script but did not work. Call them and can fill out form - could not find them on our system to send directly.

## 2025-03-10 NOTE — TELEPHONE ENCOUNTER
Dr. Brown, please see update from patient. FYI.    Clarification sent regarding pharmacy as no refill requests noted in chart.    Please respond directly to the patient if no additional staff support is required.     Future Appointments   Date Time Provider Department Center   3/28/2025 10:00 AM [facility] CT RM1 CARD [facility] CT EM Main [place]   4/19/2025  8:30 AM [facility] CT RM1 [facility] CT SCAN EM [facility]   7/5/2025  9:30 AM [facility]

## 2025-03-11 ENCOUNTER — MED REC SCAN ONLY (OUTPATIENT)
Dept: FAMILY MEDICINE CLINIC | Facility: CLINIC | Age: 65
End: 2025-03-11

## 2025-03-17 RX ORDER — METOPROLOL TARTRATE 25 MG/1
25 TABLET, FILM COATED ORAL AS DIRECTED
COMMUNITY
Start: 2025-02-28

## 2025-03-18 DIAGNOSIS — E03.9 HYPOTHYROIDISM, UNSPECIFIED TYPE: ICD-10-CM

## 2025-03-21 RX ORDER — LEVOTHYROXINE SODIUM 100 UG/1
100 TABLET ORAL DAILY
Qty: 90 TABLET | Refills: 3 | Status: SHIPPED | OUTPATIENT
Start: 2025-03-21

## 2025-03-21 NOTE — TELEPHONE ENCOUNTER
Please Review. Protocol Failed; No Protocol     Requested Prescriptions   Lab Results   Component Value Date     TSH 5.842 (H) 01/18/2025

## 2025-03-28 ENCOUNTER — HOSPITAL ENCOUNTER (OUTPATIENT)
Dept: CT IMAGING | Facility: HOSPITAL | Age: 65
Discharge: HOME | End: 2025-03-28
Attending: INTERNAL MEDICINE
Payer: MEDICARE

## 2025-03-28 VITALS — BODY MASS INDEX: 29.35 KG/M2 | WEIGHT: 205 LBS | HEIGHT: 70 IN

## 2025-03-28 DIAGNOSIS — I25.10 CORONARY ARTERY DISEASE: ICD-10-CM

## 2025-03-28 LAB
CREAT BLD-MCNC: 1.2 MG/DL
EGFRCR SERPLBLD CKD-EPI 2021: 67 ML/MIN/1.73M2 (ref 60–?)

## 2025-03-28 PROCEDURE — 75574 CT ANGIO HRT W/3D IMAGE: CPT | Performed by: INTERNAL MEDICINE

## 2025-03-28 PROCEDURE — 75580 N-INVAS EST C FFR SW ALY CTA: CPT | Performed by: INTERNAL MEDICINE

## 2025-03-28 PROCEDURE — 82565 ASSAY OF CREATININE: CPT

## 2025-03-28 RX ORDER — METOPROLOL TARTRATE 1 MG/ML
5 INJECTION, SOLUTION INTRAVENOUS SEE ADMIN INSTRUCTIONS
Status: DISCONTINUED | OUTPATIENT
Start: 2025-03-28 | End: 2025-03-30

## 2025-03-28 RX ORDER — NITROGLYCERIN 0.4 MG/1
0.4 TABLET SUBLINGUAL ONCE
Status: COMPLETED | OUTPATIENT
Start: 2025-03-28 | End: 2025-03-28

## 2025-03-28 RX ORDER — DILTIAZEM HYDROCHLORIDE 5 MG/ML
5 INJECTION INTRAVENOUS SEE ADMIN INSTRUCTIONS
Status: DISCONTINUED | OUTPATIENT
Start: 2025-03-28 | End: 2025-03-30

## 2025-03-28 RX ORDER — METOPROLOL TARTRATE 25 MG/1
100 TABLET, FILM COATED ORAL ONCE AS NEEDED
Status: DISCONTINUED | OUTPATIENT
Start: 2025-03-28 | End: 2025-03-30

## 2025-03-28 RX ORDER — METOPROLOL TARTRATE 25 MG/1
50 TABLET, FILM COATED ORAL ONCE AS NEEDED
Status: DISCONTINUED | OUTPATIENT
Start: 2025-03-28 | End: 2025-03-28

## 2025-03-28 RX ORDER — METOPROLOL TARTRATE 25 MG/1
100 TABLET, FILM COATED ORAL ONCE AS NEEDED
Status: DISCONTINUED | OUTPATIENT
Start: 2025-03-28 | End: 2025-03-28

## 2025-03-28 RX ORDER — METOPROLOL TARTRATE 25 MG/1
50 TABLET, FILM COATED ORAL ONCE AS NEEDED
Status: DISCONTINUED | OUTPATIENT
Start: 2025-03-28 | End: 2025-03-30

## 2025-03-28 RX ADMIN — NITROGLYCERIN 0.4 MG: 0.4 TABLET SUBLINGUAL at 10:56:00

## 2025-03-28 NOTE — IMAGING NOTE
TO RAD HOLDING AT 1020      HX TAKEN: PALPITATIONS    PT CONSENTED AT 1026     BASELINE VITAL SIGNS: HR 56  /72 BMI 29.4    CTA ORDERED BY DR AUSTIN WAS PT GIVEN CTA PREMEDS YES, 25MG PO METOPROLOL X2 DOSES    18 GAUGE IV STARTED AT 1030 POC TESTING COMPLETED GFR = 67   CREATINE = 1.2    TO CT TABLE @ 1055    CONNECT TO MONITOR  HR 53 /66      NITROGLYCERIN 0.4 MILLIGRAMS SUBLINGUAL GIVEN AT 1056     CALCIUM SCORE COMPLETED AT 1103     INJECTION STARTED AT  1104 HR 49 DURING SCAN PROCEDURE COMPLETE    POST SCAN HR 55 /63 AT 1106    PT TO HOLDING AREA  VS Q 15 MIN X2   HR 55 /65  HR 53 /68    AVS  PROVIDED      1125 DISCHARGED HOME

## 2025-04-08 NOTE — H&P
The above referenced H&P was reviewed by Kvng Perez MD on 4/21/2025, the patient was examined and no significant changes have occurred in the patient's condition since the H&P was performed. The risks, benefits, and alternatives were discussed with the patient and/or the family who consented.  The patient had a screening history (including review of previous problems with anesthesia and history of heavy snoring or sleep apnea)  and exam completed and there are no contraindications to sedation.  ASA designation is ASA 2 - Patient with mild systemic disease with no functional limitations.  Mallampati score: I (soft palate, uvula, fauces, and tonsillar pillars visible).

## 2025-04-21 ENCOUNTER — HOSPITAL ENCOUNTER (OUTPATIENT)
Dept: INTERVENTIONAL RADIOLOGY/VASCULAR | Facility: HOSPITAL | Age: 65
Discharge: HOME | End: 2025-04-21
Attending: INTERNAL MEDICINE | Admitting: INTERNAL MEDICINE
Payer: MEDICARE

## 2025-04-21 VITALS
WEIGHT: 212 LBS | SYSTOLIC BLOOD PRESSURE: 137 MMHG | HEIGHT: 70 IN | DIASTOLIC BLOOD PRESSURE: 59 MMHG | RESPIRATION RATE: 19 BRPM | TEMPERATURE: 98 F | BODY MASS INDEX: 30.35 KG/M2 | OXYGEN SATURATION: 95 % | HEART RATE: 75 BPM

## 2025-04-21 DIAGNOSIS — R07.89 CHEST HEAVINESS: ICD-10-CM

## 2025-04-21 DIAGNOSIS — R93.1 ABNORMAL CARDIAC CT ANGIOGRAPHY: ICD-10-CM

## 2025-04-21 DIAGNOSIS — I25.10 CAD (CORONARY ARTERY DISEASE): ICD-10-CM

## 2025-04-21 LAB
GLUCOSE BLDC GLUCOMTR-MCNC: 122 MG/DL (ref 70–99)
ISTAT ACTIVATED CLOTTING TIME: 251 SECONDS (ref 125–137)

## 2025-04-21 PROCEDURE — 92978 ENDOLUMINL IVUS OCT C 1ST: CPT | Performed by: INTERNAL MEDICINE

## 2025-04-21 PROCEDURE — 85347 COAGULATION TIME ACTIVATED: CPT

## 2025-04-21 PROCEDURE — 93458 L HRT ARTERY/VENTRICLE ANGIO: CPT | Performed by: INTERNAL MEDICINE

## 2025-04-21 PROCEDURE — 82962 GLUCOSE BLOOD TEST: CPT

## 2025-04-21 PROCEDURE — 99153 MOD SED SAME PHYS/QHP EA: CPT | Performed by: INTERNAL MEDICINE

## 2025-04-21 PROCEDURE — 99211 OFF/OP EST MAY X REQ PHY/QHP: CPT

## 2025-04-21 PROCEDURE — 99152 MOD SED SAME PHYS/QHP 5/>YRS: CPT | Performed by: INTERNAL MEDICINE

## 2025-04-21 PROCEDURE — 36415 COLL VENOUS BLD VENIPUNCTURE: CPT

## 2025-04-21 RX ORDER — IOPAMIDOL 612 MG/ML
200 INJECTION, SOLUTION INTRAVASCULAR
Status: COMPLETED | OUTPATIENT
Start: 2025-04-21 | End: 2025-04-21

## 2025-04-21 RX ORDER — ASPIRIN 81 MG/1
324 TABLET, CHEWABLE ORAL ONCE
Status: DISCONTINUED | OUTPATIENT
Start: 2025-04-21 | End: 2025-04-21

## 2025-04-21 RX ORDER — LIDOCAINE HYDROCHLORIDE 20 MG/ML
INJECTION, SOLUTION INFILTRATION; PERINEURAL
Status: COMPLETED
Start: 2025-04-21 | End: 2025-04-21

## 2025-04-21 RX ORDER — VERAPAMIL HYDROCHLORIDE 2.5 MG/ML
INJECTION INTRAVENOUS
Status: COMPLETED
Start: 2025-04-21 | End: 2025-04-21

## 2025-04-21 RX ORDER — NITROGLYCERIN 20 MG/100ML
INJECTION INTRAVENOUS
Status: COMPLETED
Start: 2025-04-21 | End: 2025-04-21

## 2025-04-21 RX ORDER — PRASUGREL 10 MG/1
10 TABLET, FILM COATED ORAL DAILY
Status: DISCONTINUED | OUTPATIENT
Start: 2025-04-22 | End: 2025-04-21

## 2025-04-21 RX ORDER — PRASUGREL 10 MG/1
10 TABLET, FILM COATED ORAL DAILY
Qty: 30 TABLET | Refills: 5 | Status: SHIPPED | OUTPATIENT
Start: 2025-04-21 | End: 2025-04-21

## 2025-04-21 RX ORDER — PRASUGREL 10 MG/1
TABLET, FILM COATED ORAL
Status: COMPLETED
Start: 2025-04-21 | End: 2025-04-21

## 2025-04-21 RX ORDER — SODIUM CHLORIDE 9 MG/ML
3 INJECTION, SOLUTION INTRAVENOUS
Status: COMPLETED | OUTPATIENT
Start: 2025-04-21 | End: 2025-04-21

## 2025-04-21 RX ORDER — HEPARIN SODIUM 1000 [USP'U]/ML
INJECTION, SOLUTION INTRAVENOUS; SUBCUTANEOUS
Status: COMPLETED
Start: 2025-04-21 | End: 2025-04-21

## 2025-04-21 RX ORDER — MIDAZOLAM HYDROCHLORIDE 1 MG/ML
INJECTION INTRAMUSCULAR; INTRAVENOUS
Status: COMPLETED
Start: 2025-04-21 | End: 2025-04-21

## 2025-04-21 RX ORDER — CHLORHEXIDINE GLUCONATE 40 MG/ML
SOLUTION TOPICAL
Status: DISCONTINUED | OUTPATIENT
Start: 2025-04-21 | End: 2025-04-21

## 2025-04-21 RX ORDER — ASPIRIN 81 MG/1
81 TABLET ORAL DAILY
Status: DISCONTINUED | OUTPATIENT
Start: 2025-04-22 | End: 2025-04-21

## 2025-04-21 RX ORDER — PRASUGREL 10 MG/1
10 TABLET, FILM COATED ORAL DAILY
Qty: 30 TABLET | Refills: 5 | Status: SHIPPED | OUTPATIENT
Start: 2025-04-21

## 2025-04-21 RX ORDER — SODIUM CHLORIDE 9 MG/ML
INJECTION, SOLUTION INTRAVENOUS CONTINUOUS
Status: ACTIVE | OUTPATIENT
Start: 2025-04-21 | End: 2025-04-21

## 2025-04-21 RX ADMIN — IOPAMIDOL 200 ML: 612 INJECTION, SOLUTION INTRAVASCULAR at 08:28:00

## 2025-04-21 RX ADMIN — SODIUM CHLORIDE 3 ML/KG/HR: 9 INJECTION, SOLUTION INTRAVENOUS at 06:45:00

## 2025-04-21 NOTE — CARDIAC REHAB
Cardiac Rehab Phase I    Activity:  Distance Bedrest per order    Education:  Handouts provided and reviewed: [facility] Booklet.       Diet: Healthy Cardiac diet reviewed.    Disease Process: Disease process reviewed.    Reviewed the following: SITE CARE: Reviewed      RISK FACTORS: Reviewed      SMOKING CESSATION: Reviewed      HOME EXERCISE ACTIVITY: Reviewed      OUTPATIENT CARDIAC REHAB: Referred to Cardiac Rehabilitation

## 2025-04-21 NOTE — DIETARY NOTE
NUTRITION EDUCATION NOTE     Received consult for cardiac nutrition education. Verbally reviewed basic cardiac diet restrictions. Provided with Eating Heart-Healthy handout to reinforce. Receptive to instruction. May benefit from outpt f/u. Expect good compliance. RD contact information provided to pt.        Dorinda Gray, MS, RDN, LDN  Clinical Dietitian

## 2025-04-21 NOTE — PROCEDURES
[facility]  part of [facility] Cardiac Cath Procedure Note  Terrance Jordan Patient Status:  Outpatient    1960 MRN [id number]   Location [facility] INTERVENTIONAL SUITES Attending Kvng Perez MD   Hosp Day # 0 PCP Khushi Brown MD       Cardiologist: Kvng Perez MD  Primary Proceduralist: Kvng Perez MD  Procedure Performed: LHC, COR, and PCI of distal mid and proximal RCA with RAJINDER x 3 IVUS guided  Date of Procedure: 2025     Pre procedure diagnosis: Abnormal CTA with CT FFR, dyspnea on exertion  Post procedure diagnosis: As above    Summary of Case: After written informed consent was obtained from the patient, patient was brought to the cardiac catheterization laboratory.  Patient was prepped and draped in the usual sterile fashion. Lidocaine 1% was used to infiltrate the right wrist for local anesthesia and a 5-6 Syriac Slender introducer sheath was inserted into the right radial artery.      Selective coronary angiography performed with TIG catheter for RCA and for LCA.  Angiography performed in standard projections.        Post procedure findings:  1) Left Ventriculography at 30 degrees [place]: Not obtained, echo available  2) Hemodynamics: LVEDP: 19 mmHg, no gradient across aortic valve  3) Coronaries:  Left main: No significant disease  LAD: Minimal luminal irregularity  Circumflex: Mild disease throughout with 40% stenosis in the obtuse marginal branch  RCA: Proximal RCA has a long segment of 70% stenosis followed by focal 80% stenosis of the distal segment.  Large vessel, dominant.    Specimen sent to: No specimen collected  Estimated blood loss: 10 cc  Closure: TR band      Lesion #1 proximal and distal RCA  Lesion Characteristics-minimal torturous, minimal calcified.  Type non-C lesion.  Pre-intervention stenosis 80%, Post intervention stenosis 0%.  Pre GOPI 3, Post GOPI 3.     Guide Catheter: JR4  Wire: Tout  Intravascular ultrasound:  Intravascular ultrasound was performed for sizing and assessment of disease.  Stent: 4 mm x 16 mm Synergy XD in the distal lesion, 4 mm x 38 mm Synergy XD starting in the mid followed by 4 mm x 20 mm with overlap in the proximal segment  Post-dilation Balloon: 4 mm x 15 mm noncompliant@20 WHITNEY    IV was maintained by RN and moderate conscious sedation of versed and fentanyl was given.  Patient was assessed and monitoring of oxygen, heart rate and blood pressure by nurse and myself during the exam from 7:57 AM to 8:27 AM.      Kvng Perez MD  04/21/25

## 2025-04-21 NOTE — DISCHARGE INSTRUCTIONS
Transradial Angiogram Discharge Instructions    The following instructions are for patient who have had an angiogram, cardiac cath, angioplasty, or stent through the radial artery.    Proper skin puncture site care is important during the healing period. This guideline should help to remind you of the verbal instructions you received from your physician or nurse.    Site: right wrist    Site Care:  For 5 days after the procedure, make sure the wrist is not submerged in water or any liquid.  Leave bandage in place for 24 hours. Then, gently wash with soap and water. Do no put any other bandage, ointment, powders, or creams to the site.  For local swelling: apply ice  If bleeding occurs, elevate the hand above the heart and apply local pressure    Activity/Driving:  Avoid wrist flexion, extension, and fine motor activities (i.e. Texting, typing, using a computer mouse, etc.) for 24 hours.  Do not drive for 24 hours.  Do not lift or pull anything heavier than 10 pounds with affected hand for 1 week.    Additional Instructions:  Do not take glucophage/metformin containing products for at least 48 hours after procedure, unless otherwise directed by your physician.  May call on call PCI RN at [phone] for any problems or concerns.    When to contact your physician  Call Dr. Perez right away if you experience any of the following:    Swelling, pain, or bleeding at the site that is not relieved by applying ice or pressure  Signs of infection: redness, warmth, drainage at the site, chills, or temperature of 100.5 degrees or greater.  Changes in sensation, numbness, or tingling of affected hand.      HOME CARE INSTRUCTIONS FOLLOWING CORONARY ANGIOGRAPHY, ANGIOPLASTY (PTCA/PTA) OR INSERTION OF STENT IN THE CORONARY      Activity  DO NOT drive after the procedure.  You may resume driving late the following day according to the nurse or physician's instructions  Plan on resting and relaxing tonight and tomorrow  Resume your  normal activity after 48 hours, or as instructed by your physician  Do not lift anything over 10 pounds for 1 week  Avoid drinking alcohol for the next 24 hours  If the groin site was used, avoid repeated stair use and excessive walking for the next 24 hours    What is Normal?  A small lump at the procedure site associated with mild tenderness when touched  The procedure site may be bruised or discolored  There may be a small amount of drainage on the bandage    Special Instructions  Drink plenty of fluids during the next 24 hours to \"flush\" the contrast from your system  The bandage is to remain in place for 24 hours  Keep the bandage clean and dry  DO NOT submerge the procedure site for 1 week (no bath tubs or pools)  After 24 hours, you must remove the bandage  You should shower after removing the bandage, and wash the procedure site gently with soap and water  If you choose to wear a bandage for a few days, make sure it remains clean and dry and that it is changed daily    When you should NOTIFY YOUR PHYSICIAN  Bleeding can occur at the procedure site - both on the outside of the skin and/or beneath the surface of the skin  Swelling or a large lump at the procedure site can occur, which may be accompanied by moderate to severe pain    If either of the above occurs, lie down flat.  Have someone apply pressure to the procedure site with both hands, as instructed by the nurse.  Hold pressure for 20 minutes and the bleeding should stop.  Notify your physician of the occurrence  If the bleeding does not stop, call 911 and continue to apply pressure    If you experience signs of a fever, temperature > 101°, chills, infection (redness, swelling, thick yellow drainage, or a foul odor from the procedure site)  If you notice any numbness, tingling, or loss of feeling to your leg or foot or groin access  If you notice any numbness, tingling, or loss of feeling to your fingers or hand, if wrist access was utilized    If You  Received a Stent:    You will remain on an antiplatelet drug and/or aspirin.  Antiplatelet medications are usually taken for six months to one year and should not be stopped unless your cardiologist directs you to do so.  These medications help to prevent blockage at the stent site.  If another physician or dentist asks you to stop your antiplatelet medication, you need to consult your cardiologist first.  Together, your cardiologist and other physician can discuss the risks that may be involved if you are not taking the antiplatelet medication   If an MRI is necessary, it may be done 4-6 weeks after your procedure.  Verify this with your cardiologist  Keep your stent card with you at all times!  If you need an MRI in the future, your stent card will need to be shown to the technologist before performing the MRI.  A duplicate card CANNOT be reproduced.    Other  You may resume your present diet, unless otherwise specified by your physician.  You may resume all of your medications as prescribed, unless otherwise directed by your physician.  A list of your medications was provided to you at discharge.  Continue the walking program initiated in the hospital and progress your walking as directed.  Or, gradually resume your previous aerobic exercise schedule as tolerated.

## 2025-04-21 NOTE — IVS NOTE
DISCHARGE NOTE      Pt is able to sit up and ambulate without difficulty.   Pt voided and tolerated fluids and food.   Procedural site remains dry and intact with good circulation, motion, and sensation.   No signs and symptoms of bleeding/hematoma noted.   Instruction provided, patient/family verbalizes understanding.   Dr. Perez spoke with patient/family post procedure.      Follow up Appointment: 4/29 at 1050 with Dr. Perez  Cardiac Rehab and Dietician seen patient   Effient prescription sent and available for pick-up

## 2025-04-26 ENCOUNTER — HOSPITAL ENCOUNTER (OUTPATIENT)
Dept: CT IMAGING | Facility: HOSPITAL | Age: 65
Discharge: HOME | End: 2025-04-26
Attending: FAMILY MEDICINE
Payer: MEDICARE

## 2025-04-26 DIAGNOSIS — Z87.891 HISTORY OF TOBACCO USE: ICD-10-CM

## 2025-04-26 PROCEDURE — 71271 CT THORAX LUNG CANCER SCR C-: CPT | Performed by: FAMILY MEDICINE

## 2025-05-05 ENCOUNTER — MED REC SCAN ONLY (OUTPATIENT)
Dept: FAMILY MEDICINE CLINIC | Facility: CLINIC | Age: 65
End: 2025-05-05

## 2025-05-17 ENCOUNTER — PATIENT MESSAGE (OUTPATIENT)
Dept: FAMILY MEDICINE CLINIC | Facility: CLINIC | Age: 65
End: 2025-05-17

## 2025-07-04 ENCOUNTER — PATIENT MESSAGE (OUTPATIENT)
Dept: FAMILY MEDICINE CLINIC | Facility: CLINIC | Age: 65
End: 2025-07-04

## 2025-07-04 DIAGNOSIS — E03.9 HYPOTHYROIDISM, UNSPECIFIED TYPE: ICD-10-CM

## 2025-07-04 DIAGNOSIS — E11.9 TYPE 2 DIABETES MELLITUS WITHOUT COMPLICATION, WITHOUT LONG-TERM CURRENT USE OF INSULIN (HCC): Primary | ICD-10-CM

## 2025-07-04 DIAGNOSIS — E78.2 MIXED HYPERLIPIDEMIA: ICD-10-CM

## 2025-07-18 ENCOUNTER — LAB ENCOUNTER (OUTPATIENT)
Dept: LAB | Age: 65
End: 2025-07-18
Attending: FAMILY MEDICINE
Payer: MEDICARE

## 2025-07-18 LAB
ALBUMIN SERPL-MCNC: 4.7 G/DL (ref 3.2–4.8)
ALBUMIN/GLOB SERPL: 1.7 {RATIO} (ref 1–2)
ALP LIVER SERPL-CCNC: 59 U/L (ref 45–117)
ALT SERPL-CCNC: 32 U/L (ref 10–49)
ANION GAP SERPL CALC-SCNC: 9 MMOL/L (ref 0–18)
AST SERPL-CCNC: 29 U/L (ref ?–34)
BILIRUB SERPL-MCNC: 0.8 MG/DL (ref 0.2–1.1)
BUN BLD-MCNC: 16 MG/DL (ref 9–23)
BUN/CREAT SERPL: 13 (ref 10–20)
CALCIUM BLD-MCNC: 9.6 MG/DL (ref 8.7–10.4)
CHLORIDE SERPL-SCNC: 106 MMOL/L (ref 98–112)
CHOLEST SERPL-MCNC: 132 MG/DL (ref ?–200)
CO2 SERPL-SCNC: 25 MMOL/L (ref 21–32)
CREAT BLD-MCNC: 1.23 MG/DL (ref 0.7–1.3)
EGFRCR SERPLBLD CKD-EPI 2021: 65 ML/MIN/1.73M2 (ref 60–?)
EST. AVERAGE GLUCOSE BLD GHB EST-MCNC: 160 MG/DL (ref 68–126)
FASTING PATIENT LIPID ANSWER: YES
FASTING STATUS PATIENT QL REPORTED: YES
GLOBULIN PLAS-MCNC: 2.8 G/DL (ref 2–3.5)
GLUCOSE BLD-MCNC: 129 MG/DL (ref 70–99)
HBA1C MFR BLD: 7.2 % (ref ?–5.7)
HDLC SERPL-MCNC: 42 MG/DL (ref 40–59)
LDLC SERPL CALC-MCNC: 52 MG/DL (ref ?–100)
NONHDLC SERPL-MCNC: 90 MG/DL (ref ?–130)
OSMOLALITY SERPL CALC.SUM OF ELEC: 293 MOSM/KG (ref 275–295)
POTASSIUM SERPL-SCNC: 4.1 MMOL/L (ref 3.5–5.1)
PROT SERPL-MCNC: 7.5 G/DL (ref 5.7–8.2)
SODIUM SERPL-SCNC: 140 MMOL/L (ref 136–145)
TRIGL SERPL-MCNC: 238 MG/DL (ref 30–149)
TSI SER-ACNC: 4.22 UIU/ML (ref 0.55–4.78)
VLDLC SERPL CALC-MCNC: 34 MG/DL (ref 0–30)

## 2025-07-18 PROCEDURE — 80061 LIPID PANEL: CPT | Performed by: FAMILY MEDICINE

## 2025-07-18 PROCEDURE — 84443 ASSAY THYROID STIM HORMONE: CPT | Performed by: FAMILY MEDICINE

## 2025-07-18 PROCEDURE — 83036 HEMOGLOBIN GLYCOSYLATED A1C: CPT | Performed by: FAMILY MEDICINE

## 2025-07-18 PROCEDURE — 36415 COLL VENOUS BLD VENIPUNCTURE: CPT | Performed by: FAMILY MEDICINE

## 2025-07-18 PROCEDURE — 80053 COMPREHEN METABOLIC PANEL: CPT | Performed by: FAMILY MEDICINE

## 2025-07-19 ENCOUNTER — OFFICE VISIT (OUTPATIENT)
Dept: FAMILY MEDICINE CLINIC | Facility: CLINIC | Age: 65
End: 2025-07-19

## 2025-07-19 VITALS
DIASTOLIC BLOOD PRESSURE: 73 MMHG | BODY MASS INDEX: 31.3 KG/M2 | HEART RATE: 86 BPM | SYSTOLIC BLOOD PRESSURE: 117 MMHG | HEIGHT: 70 IN | WEIGHT: 218.63 LBS

## 2025-07-19 DIAGNOSIS — J43.2 CENTRILOBULAR EMPHYSEMA (HCC): ICD-10-CM

## 2025-07-19 DIAGNOSIS — E78.2 MIXED HYPERLIPIDEMIA: ICD-10-CM

## 2025-07-19 DIAGNOSIS — E11.9 TYPE 2 DIABETES MELLITUS WITHOUT COMPLICATION, WITHOUT LONG-TERM CURRENT USE OF INSULIN (HCC): Primary | ICD-10-CM

## 2025-07-19 DIAGNOSIS — E55.9 VITAMIN D DEFICIENCY: ICD-10-CM

## 2025-07-19 DIAGNOSIS — R49.0 RASPY VOICE: ICD-10-CM

## 2025-07-19 DIAGNOSIS — E03.9 HYPOTHYROIDISM, UNSPECIFIED TYPE: ICD-10-CM

## 2025-07-19 DIAGNOSIS — I25.10 CORONARY ARTERY DISEASE INVOLVING NATIVE CORONARY ARTERY OF NATIVE HEART WITHOUT ANGINA PECTORIS: ICD-10-CM

## 2025-07-19 PROCEDURE — 99214 OFFICE O/P EST MOD 30 MIN: CPT | Performed by: FAMILY MEDICINE

## 2025-07-19 NOTE — PROGRESS NOTES
The following individual(s) verbally consented to be recorded using ambient AI listening technology and understand that they can each withdraw their consent to this listening technology at any point by asking the clinician to turn off or pause the recording:    Patient name: Terrance Jordan is a 64 year old male.   Chief Complaint   Patient presents with    Follow - Up     6 months     HPI:        Terrance Jordan is a 64 year old male with diabetes and coronary artery disease who presents with stress and worsening health concerns.    He is experiencing significant stress due to recent job-related issues, including having to lay off employees and facing his own job loss after August 22nd. This stress has led to increased travel, poor eating habits, and emotional eating, which he attributes to his worsening health condition.    He describes a recent episode involving his left toe, where he experienced pain in the middle of the night, followed by bleeding that persisted intermittently for about two months. The bleeding would scab over but then resume, and he suspects it might be related to his diabetes or blood thinners. He mentions frequent bruising, particularly on his forearms and chest, which he attributes to the blood thinners he is taking. He has adapted his typing posture to minimize bruising on his forearms.    He recalls experiencing severe back pain in the lower back, radiating up under the ribs, which lasted for about a month before resolving. He did not seek medical attention at the time.    He has not yet started cardiac rehabilitation after his heart procedure due to his busy schedule and frequent travel.    He reports a change in his voice, noting it has become raspier, which he attributes to 'forty something years of smoking.' He also mentions feeling tired, which he associates with his recent travel experiences, including a prolonged stay at an airport due to flight delays.    He  acknowledges a rise in his blood sugar levels, with a recent reading of 7.2, up from a previous low of 6.4. He attributes this increase to poor dietary habits, particularly while traveling and staying in hotels.       Medications Ordered Prior to Encounter[1]   Past Medical History[2]   Social History:  Short Social Hx on File[3]     REVIEW OF SYSTEMS:   Review of Systems   See HPI     EXAM:   /73   Pulse 86   Ht 5' 10\" (1.778 m)   Wt 218 lb 9.6 oz (99.2 kg)   BMI 31.37 kg/m²   Wt Readings from Last 3 Encounters:   07/19/25 218 lb 9.6 oz (99.2 kg)   04/21/25 212 lb (96.2 kg)   03/28/25 205 lb (93 kg)     GENERAL: well developed, well nourished,in no apparent distress  HEENT: raspy voice   LUNGS: clear to auscultation  CARDIO: RRR without murmur    ASSESSMENT AND PLAN:   1. Type 2 diabetes mellitus without complication, without long-term current use of insulin (HCC)    - Hemoglobin A1C; Future  - Microalb/Creat Ratio, Random Urine; Future  - TSH W Reflex To Free T4; Future  - Vitamin B12; Future  - Vitamin D; Future  - CBC With Differential With Platelet; Future  - Comp Metabolic Panel (14); Future  - Lipid Panel; Future    2. Mixed hyperlipidemia      3. Hypothyroidism, unspecified type      4. Coronary artery disease involving native coronary artery of native heart without angina pectoris      5. Centrilobular emphysema (HCC)      6. Raspy voice    - ENT Referral - In Network    7. Vitamin D deficiency    - Vitamin D; Future       Diabetes Mellitus  Poorly controlled with HbA1c of 7.2 due to poor dietary habits and stress.  - Encouraged dietary modifications.    Cardiac Rehabilitation  Has not started post-stent rehabilitation due to work and travel commitments.  - Contact cardiologist for rehabilitation referral.  - Schedule rehabilitation sessions.    Epistaxis  Prolonged toe bleeding possibly due to blood thinners.    Easy Bruising  Frequent bruising on forearms and chest, likely related to blood  thinner use.    Voice Changes  Raspier voice possibly due to smoking and stress. ENT evaluation recommended.  - Refer to ENT for vocal cord evaluation.    Depression  Depression related to job loss and stress. Declines medication.  - Monitor mood and reassess need for medication in future.         The patient indicates understanding of these issues and agrees to the plan.      Khushi Brown MD  7/19/2025  11:01 AM         [1]   Current Outpatient Medications on File Prior to Visit   Medication Sig Dispense Refill    prasugrel 10 MG Oral Tab Take 1 tablet (10 mg total) by mouth daily. 30 tablet 5    levothyroxine 100 MCG Oral Tab Take 1 tablet (100 mcg total) by mouth daily. 90 tablet 3    dapagliflozin (FARXIGA) 10 MG Oral Tab Take 1 tablet (10 mg total) by mouth daily. 90 tablet 4    lisinopril 10 MG Oral Tab Take 1 tablet (10 mg total) by mouth daily. 90 tablet 3    rosuvastatin 40 MG Oral Tab Take 1 tablet (40 mg total) by mouth nightly. 90 tablet 3    albuterol 108 (90 Base) MCG/ACT Inhalation Aero Soln Inhale 2 puffs into the lungs every 4 to 6 hours as needed for Wheezing or Shortness of Breath. 48 g 3    aspirin (ASPIRIN LOW DOSE) 81 MG Oral Tab EC Take 1 tablet (81 mg total) by mouth daily. 90 tablet 3    Cholecalciferol (VITAMIN D) 50 MCG (2000 UT) Oral Cap Take by mouth.      Ascorbic Acid (VITAMIN C) 100 MG Oral Tab Take 1 tablet (100 mg total) by mouth in the morning.      metoprolol tartrate 25 MG Oral Tab Take 1 tablet (25 mg total) by mouth As Directed.       No current facility-administered medications on file prior to visit.   [2]   Past Medical History:   Acute hepatitis    Hep A    Arthritis    BPH    GP diagnosed. Upcoming urologist appt on 9/18/18    BPH (benign prostatic hyperplasia)    Colon adenoma    repeat colonoscopy 9/2023    COPD (chronic obstructive pulmonary disease) (HCC)    Coronary atherosclerosis    Decorative tattoo    Diabetes (HCC)    Disorder of thyroid    Essential  hypertension    Exposure to hepatitis    Hep A    Fetal and  jaundice    Hemorrhoids    1    Hepatitis    High blood pressure    High cholesterol    Lupus    Not me but my sister has Lupus    Neuropathy    Osteoarthritis    Other and unspecified hyperlipidemia    Pneumonia due to organism    PONV (postoperative nausea and vomiting)    Pulmonary emphysema (HCC)    Visual impairment    glasses   [3]   Social History  Socioeconomic History    Marital status:    Tobacco Use    Smoking status: Former     Current packs/day: 0.00     Average packs/day: 1 pack/day for 40.0 years (40.0 ttl pk-yrs)     Types: Cigarettes     Start date: 1979     Quit date: 2019     Years since quittin.6    Smokeless tobacco: Never   Vaping Use    Vaping status: Never Used   Substance and Sexual Activity    Alcohol use: Yes     Alcohol/week: 2.0 standard drinks of alcohol     Types: 2 Glasses of wine per week     Comment: Occasional glass of wine. Maybe one or two glasses a month.    Drug use: No

## 2025-08-14 ENCOUNTER — ORDER TRANSCRIPTION (OUTPATIENT)
Dept: CARDIAC REHAB | Facility: HOSPITAL | Age: 65
End: 2025-08-14

## 2025-08-14 DIAGNOSIS — Z95.820 S/P ANGIOPLASTY WITH STENT: Primary | ICD-10-CM

## 2025-08-21 ENCOUNTER — CARDPULM VISIT (OUTPATIENT)
Dept: CARDIAC REHAB | Facility: HOSPITAL | Age: 65
End: 2025-08-21
Attending: INTERNAL MEDICINE

## 2025-08-21 DIAGNOSIS — Z95.820 S/P ANGIOPLASTY WITH STENT: Primary | ICD-10-CM

## (undated) DEVICE — KIT CLEAN ENDOKIT 1.1OZ GOWNX2

## (undated) DEVICE — KIT ENDO ORCAPOD 160/180/190

## (undated) DEVICE — 60 ML SYRINGE REGULAR TIP: Brand: MONOJECT

## (undated) DEVICE — MEDI-VAC NON-CONDUCTIVE SUCTION TUBING 6MM X 1.8M (6FT.) L: Brand: CARDINAL HEALTH

## (undated) DEVICE — LINE MNTR ADLT SET O2 INTMD

## (undated) DEVICE — SNARE OPTMZ PLPCTM TRP

## (undated) DEVICE — SNARE ENDOSCOPIC 10MM ROUND

## (undated) NOTE — LETTER
11/04/19        [facility]      Dear Jemma Queen,    Our records indicate that you have outstanding lab work and or testing that was ordered for you and has not yet been completed:  Orders Placed This Encounter

## (undated) NOTE — LETTER
[address]  [address] [place], [place]  Authorization for Surgical Operation and Procedure                                                                                           I hereby authorize Hollie Nelson MD, my physician and his/her assistants (if applicable), which may include medical students, residents, and/or fellows, to perform the following surgical operation/ procedure and administer such anesthesia as may be determined necessary by my physician: Operation/Procedure name (s) COLONOSCOPY on Forrestine Jennifer   2. I recognize that during the surgical operation/procedure, unforeseen conditions may necessitate additional or different procedures than those listed above. I, therefore, further authorize and request that the above-named surgeon, assistants, or designees perform such procedures as are, in their judgment, necessary and desirable. 3.   My surgeon/physician has discussed prior to my surgery the potential benefits, risks and side effects of this procedure; the likelihood of achieving goals; and potential problems that might occur during recuperation. They also discussed reasonable alternatives to the procedure, including risks, benefits, and side effects related to the alternatives and risks related to not receiving this procedure. I have had all my questions answered and I acknowledge that no guarantee has been made as to the result that may be obtained. 4.   Should the need arise during my operation/procedure, which includes change of level of care prior to discharge, I also consent to the administration of blood and/or blood products. Further, I understand that despite careful testing and screening of blood or blood products by collecting agencies, I may still be subject to ill effects as a result of receiving a blood transfusion and/or blood products.   The following are some, but not all, of the potential risks that can occur: fever and allergic reactions, hemolytic reactions, transmission of diseases such as Hepatitis, AIDS and Cytomegalovirus (CMV) and fluid overload. In the event that I wish to have an autologous transfusion of my own blood, or a directed donor transfusion, I will discuss this with my physician. Check only if Refusing Blood or Blood Products  I understand refusal of blood or blood products as deemed necessary by my physician may have serious consequences to my condition to include possible death. I hereby assume responsibility for my refusal and release the hospital, its personnel, and my physicians from any responsibility for the consequences of my refusal.    o  Refuse   5. I authorize the use of any specimen, organs, tissues, body parts or foreign objects that may be removed from my body during the operation/procedure for diagnosis, research or teaching purposes and their subsequent disposal by hospital authorities. I also authorize the release of specimen test results and/or written reports to my treating physician on the hospital medical staff or other referring or consulting physicians involved in my care, at the discretion of the Pathologist or my treating physician. 6.   I consent to the photographing or videotaping of the operations or procedures to be performed, including appropriate portions of my body for medical, scientific, or educational purposes, provided my identity is not revealed by the pictures or by descriptive texts accompanying them. If the procedure has been photographed/videotaped, the surgeon will obtain the original picture, image, videotape or CD. The hospital will not be responsible for storage, release or maintenance of the picture, image, tape or CD.    7.   I consent to the presence of a  or observers in the operating room as deemed necessary by my physician or their designees.     8.   I recognize that in the event my procedure results in extended X-Ray/fluoroscopy time, I may develop a skin reaction. 9. If I have a Do Not Attempt Resuscitation (DNAR) order in place, that status will be suspended while in the operating room, procedural suite, and during the recovery period unless otherwise explicitly stated by me (or a person authorized to consent on my behalf). The surgeon or my attending physician will determine when the applicable recovery period ends for purposes of reinstating the DNAR order. 10. Patients having a sterilization procedure: I understand that if the procedure is successful the results will be permanent and it will therefore be impossible for me to inseminate, conceive, or bear children. I also understand that the procedure is intended to result in sterility, although the result has not been guaranteed. 11. I acknowledge that my physician has explained sedation/analgesia administration to me including the risk and benefits I consent to the administration of sedation/analgesia as may be necessary or desirable in the judgment of my physician. I CERTIFY THAT I HAVE READ AND FULLY UNDERSTAND THE ABOVE CONSENT TO OPERATION and/or OTHER PROCEDURE.     _________________________________________ _________________________________     ___________________________________  Signature of Patient     Signature of Responsible Person                   Printed Name of Responsible Person                              _________________________________________ ______________________________        ___________________________________  Signature of Witness         Date  Time         Relationship to Patient    STATEMENT OF PHYSICIAN My signature below affirms that prior to the time of the procedure; I have explained to the patient and/or his/her legal representative, the risks and benefits involved in the proposed treatment and any reasonable alternative to the proposed treatment.  I have also explained the risks and benefits involved in refusal of the proposed treatment and alternatives to the proposed treatment and have answered the patient's questions.  If I have a significant financial interest in a co-management agreement or a significant financial interest in any product or implant, or other significant relationship used in this procedure/surgery, I have disclosed this and had a discussion with my patient.     _______________________________________________________________ _____________________________  Tamara Urbina of Physician)                                                                                         (Date)                                   (Time)  Patient Name: Anu Lobato    : 1960   Printed: 2023      Medical Record #: [id number]                                              Page 1 of 1

## (undated) NOTE — LETTER
07/28/20        [id number] [facility]      Dear Emeli Ribera,    Our records indicate that you have outstanding lab work and or testing that was ordered for you and has not yet been completed:  Orders Placed This Encounter      Rima Hodges

## (undated) NOTE — LETTER
[facility], [place]  Authorization for Invasive Procedures  1. I hereby authorize  Princess Balderas / Marielle Garay  , my physician and whomever may be designated as the doctor's assistant, to perform the following operation and/or 5. I consent to the photographing of the operations or procedures to be performed for the purposes of advancing medicine, science, and/or education, provided my identity is not revealed.  If the procedure has been videotaped, the physician/surgeon will obta __________ Time: ___________    Statement of Physician  My signature below affirms that prior to the time of the procedure, I have explained to the patient and/or his legal representative, the risks and benefits involved in the proposed treatment and any r

## (undated) NOTE — LETTER
10/28/20        [address] [address]      Dear Dakota Jain,    [facility] records indicate that you have outstanding lab work and or testing that was ordered for you and has not yet been completed:  Orders Placed This Encounter      Ember Sauer

## (undated) NOTE — LETTER
11/13/2020          To Whom It May Concern:    Stacey Lopez is currently under my medical care and may not return to work at this time due to SARS-CoV-2 (COVID-19) Positive results. Please excuse Emeli Ribera for 14 days from onset of symptoms.   He may return

## (undated) NOTE — LETTER
[facility]  Administration of Anesthesia  1. I, Noe Foy, or _________________________________ acting on his behalf, (Patient) (Dependent/Representative) request to receive anesthesia for my pending procedure/operation/treatment.   A infections, high spinal block, spinal bleeding, seizure, cardiac arrest and death. 7. AWARENESS: I understand that it is possible (but unlikely) to have explicit memory of events from the operating room while under general anesthesia.   8. ELECTROCONVULSIV unconscious pt /Relationship    My signature below affirms that prior to the time of the procedure, I have explained to the patient and/or his/her guardian, the risks and benefits of undergoing anesthesia, as well as any reasonable alternatives.     _______

## (undated) NOTE — Clinical Note
BASHIR, TCM appt 1/28/2020. TCM template completed.  Pt states he wasn't to discuss prior dx of COPD and new finding of spinal degeneration

## (undated) NOTE — LETTER
AUTHORIZATION FOR SURGICAL OPERATION OR OTHER PROCEDURE    1. I hereby authorize Dr. Kline/ Bette Simon PA-C, and [facility] staff assigned to my case to perform the following operation and/or procedure at the [facility] site:    _______________________________________________________________________________________________    Cortisone Injection to Right ankle  _______________________________________________________________________________________________    2.  My physician has explained the nature and purpose of the operation or other procedure, possible alternative methods of treatment, the risks involved, and the possibility of complication to me.  I acknowledge that no guarantee has been made as to the result that may be obtained.  3.  I recognize that, during the course of this operation, or other procedure, unforseen conditions may necessitate additional or different procedure than those listed above.  I, therefore, further authorize and request that the above named physician, his/her physician assistants or designees perform such procedures as are, in his/her professional opinion, necessary and desirable.  4.  Any tissue or organs removed in the operation or other procedure may be disposed of by and at the discretion of the [facility] and [facility].  5.  I understand that in the event of a medical emergency, I will be transported by local paramedics to [facility] [place] or other hospital emergency department.  6.  I certify that I have read and fully understand the above consent to operation and/or other procedure.    7.  I acknowledge that my physician has explained sedation/analgesia administration to me including the risks and benefits.  I consent to the administration of sedation/analgesia as may be necessary or desirable in the judgement of my physician.    Witness signature: ___________________________________________________ Date:   ______/______/_____                    Time:  ________ A.M.  P.M.       Patient Name:  ______________________________________________________  (please print)      Patient signature:  ___________________________________________________             Relationship to Patient:           []  Parent    Responsible person                          []  Spouse  In case of minor or                    [] Other  _____________   Incompetent name:  __________________________________________________                               (please print)      _____________      Responsible person  In case of minor or  Incompetent signature:  _______________________________________________    Statement of Physician  My signature below affirms that prior to the time of the procedure, I have explained to the patient and/or his/her guardian, the risks and benefits involved in the proposed treatment and any reasonable alternative to the proposed treatment.  I have also explained the risks and benefits involved in the refusal of the proposed treatment and have answered the patient's questions.                        Date:  ______/______/_______  Provider                      Signature:  __________________________________________________________       Time:  ___________ A.M    P.M.

## (undated) NOTE — LETTER
8/21/2023    [address] [place] [address]            Dear Zelalem Roe,      Our records indicate that you are due for an appointment for a Colonoscopy with Anjana Beach MD. Our doctors are booking out about 3-5 months in advance for procedures. Please call our office to schedule a phone screening appointment to plan for the procedure(s). Your medical well-being is important to us. If your insurance requires a referral, please call your primary care office to request one.       Thank you,      The Physicians and Staff at [facility]

## (undated) NOTE — LETTER
09/28/20    [address] [facility]      Dear Rian Ritter,    Our records indicate that you have outstanding lab work and or testing that was ordered for you and has not yet been completed:  Orders Placed This Encounter      Lipid Pa

## (undated) NOTE — LETTER
04/16/21        [address] [facility]      Dear Kinsey Dickey,    Our records indicate that you have outstanding lab work and or testing that was ordered for you and has not yet been completed:  Orders Placed This Encounter      He

## (undated) NOTE — LETTER
[address], [place]  Authorization for Invasive Procedures  1. I hereby authorize  Princess Mc  , my physician and whomever may be designated as the doctor's assistant, to perform the following operation and/or proced 5. I consent to the photographing of the operations or procedures to be performed for the purposes of advancing medicine, science, and/or education, provided my identity is not revealed.  If the procedure has been videotaped, the physician/surgeon will obta __________ Time: ___________    Statement of Physician  My signature below affirms that prior to the time of the procedure, I have explained to the patient and/or his legal representative, the risks and benefits involved in the proposed treatment and any r